# Patient Record
Sex: FEMALE | Race: WHITE | NOT HISPANIC OR LATINO | URBAN - METROPOLITAN AREA
[De-identification: names, ages, dates, MRNs, and addresses within clinical notes are randomized per-mention and may not be internally consistent; named-entity substitution may affect disease eponyms.]

---

## 2017-10-31 ENCOUNTER — OFFICE VISIT - HEALTHEAST (OUTPATIENT)
Dept: GERIATRICS | Facility: CLINIC | Age: 82
End: 2017-10-31

## 2017-10-31 ENCOUNTER — AMBULATORY - HEALTHEAST (OUTPATIENT)
Dept: ADMINISTRATIVE | Facility: CLINIC | Age: 82
End: 2017-10-31

## 2017-10-31 DIAGNOSIS — R53.81 PHYSICAL DECONDITIONING: ICD-10-CM

## 2017-10-31 DIAGNOSIS — R13.10 DYSPHAGIA: ICD-10-CM

## 2017-10-31 DIAGNOSIS — G62.9 NEUROPATHY: ICD-10-CM

## 2017-10-31 RX ORDER — LISINOPRIL 10 MG/1
10 TABLET ORAL DAILY
Status: SHIPPED | COMMUNITY
Start: 2017-10-31

## 2017-11-02 ENCOUNTER — OFFICE VISIT - HEALTHEAST (OUTPATIENT)
Dept: GERIATRICS | Facility: CLINIC | Age: 82
End: 2017-11-02

## 2017-11-02 DIAGNOSIS — I63.9 CVA (CEREBRAL VASCULAR ACCIDENT) (H): ICD-10-CM

## 2017-11-02 DIAGNOSIS — G62.9 NEUROPATHY: ICD-10-CM

## 2017-11-02 DIAGNOSIS — I10 HTN (HYPERTENSION): ICD-10-CM

## 2017-11-02 DIAGNOSIS — R13.10 DYSPHAGIA: ICD-10-CM

## 2017-11-09 ENCOUNTER — OFFICE VISIT - HEALTHEAST (OUTPATIENT)
Dept: GERIATRICS | Facility: CLINIC | Age: 82
End: 2017-11-09

## 2017-11-09 DIAGNOSIS — Z91.81 HISTORY OF FALLING: ICD-10-CM

## 2017-11-09 DIAGNOSIS — R53.81 PHYSICAL DECONDITIONING: ICD-10-CM

## 2017-11-09 DIAGNOSIS — Z87.898 HISTORY OF VERTIGO: ICD-10-CM

## 2017-11-14 ENCOUNTER — OFFICE VISIT - HEALTHEAST (OUTPATIENT)
Dept: GERIATRICS | Facility: CLINIC | Age: 82
End: 2017-11-14

## 2017-11-14 DIAGNOSIS — Z91.81 HISTORY OF FALLING: ICD-10-CM

## 2017-11-14 DIAGNOSIS — Z87.898 HISTORY OF VERTIGO: ICD-10-CM

## 2017-11-14 DIAGNOSIS — R53.81 PHYSICAL DECONDITIONING: ICD-10-CM

## 2017-11-14 DIAGNOSIS — I10 HTN (HYPERTENSION): ICD-10-CM

## 2017-11-16 ENCOUNTER — OFFICE VISIT - HEALTHEAST (OUTPATIENT)
Dept: GERIATRICS | Facility: CLINIC | Age: 82
End: 2017-11-16

## 2017-11-16 DIAGNOSIS — I10 HTN (HYPERTENSION): ICD-10-CM

## 2017-11-16 DIAGNOSIS — Z87.898 HISTORY OF VERTIGO: ICD-10-CM

## 2017-11-16 DIAGNOSIS — R29.6 RECURRENT FALLS: ICD-10-CM

## 2017-11-21 ENCOUNTER — OFFICE VISIT - HEALTHEAST (OUTPATIENT)
Dept: GERIATRICS | Facility: CLINIC | Age: 82
End: 2017-11-21

## 2017-11-21 DIAGNOSIS — R29.6 FALLS: ICD-10-CM

## 2017-11-21 DIAGNOSIS — I10 HTN (HYPERTENSION): ICD-10-CM

## 2017-11-21 DIAGNOSIS — G62.9 NEUROPATHY: ICD-10-CM

## 2017-11-22 ENCOUNTER — AMBULATORY - HEALTHEAST (OUTPATIENT)
Dept: GERIATRICS | Facility: CLINIC | Age: 82
End: 2017-11-22

## 2018-12-11 ENCOUNTER — AMBULATORY - HEALTHEAST (OUTPATIENT)
Dept: ADMINISTRATIVE | Facility: CLINIC | Age: 83
End: 2018-12-11

## 2018-12-11 ENCOUNTER — OFFICE VISIT - HEALTHEAST (OUTPATIENT)
Dept: GERIATRICS | Facility: CLINIC | Age: 83
End: 2018-12-11

## 2018-12-11 DIAGNOSIS — G62.9 NEUROPATHY: ICD-10-CM

## 2018-12-11 DIAGNOSIS — I10 ESSENTIAL HYPERTENSION: ICD-10-CM

## 2018-12-11 DIAGNOSIS — F01.50 VASCULAR DEMENTIA WITHOUT BEHAVIORAL DISTURBANCE (H): ICD-10-CM

## 2018-12-12 ENCOUNTER — OFFICE VISIT - HEALTHEAST (OUTPATIENT)
Dept: GERIATRICS | Facility: CLINIC | Age: 83
End: 2018-12-12

## 2018-12-12 DIAGNOSIS — E11.42 TYPE 2 DIABETES MELLITUS WITH DIABETIC POLYNEUROPATHY, WITHOUT LONG-TERM CURRENT USE OF INSULIN (H): ICD-10-CM

## 2018-12-12 DIAGNOSIS — G62.9 NEUROPATHY: ICD-10-CM

## 2018-12-12 DIAGNOSIS — I10 ESSENTIAL HYPERTENSION: ICD-10-CM
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
## 2018-12-13 ENCOUNTER — OFFICE VISIT - HEALTHEAST (OUTPATIENT)
Dept: GERIATRICS | Facility: CLINIC | Age: 83
End: 2018-12-13

## 2018-12-13 DIAGNOSIS — F01.50 VASCULAR DEMENTIA WITHOUT BEHAVIORAL DISTURBANCE (H): ICD-10-CM

## 2018-12-13 DIAGNOSIS — I10 ESSENTIAL HYPERTENSION: ICD-10-CM
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
## 2018-12-18 ENCOUNTER — OFFICE VISIT - HEALTHEAST (OUTPATIENT)
Dept: GERIATRICS | Facility: CLINIC | Age: 83
End: 2018-12-18
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
## 2018-12-18 DIAGNOSIS — I10 ESSENTIAL HYPERTENSION: ICD-10-CM

## 2018-12-18 DIAGNOSIS — F01.50 VASCULAR DEMENTIA WITHOUT BEHAVIORAL DISTURBANCE (H): ICD-10-CM

## 2018-12-19 ENCOUNTER — OFFICE VISIT - HEALTHEAST (OUTPATIENT)
Dept: GERIATRICS | Facility: CLINIC | Age: 83
End: 2018-12-19

## 2018-12-19 DIAGNOSIS — G62.9 NEUROPATHY: ICD-10-CM

## 2018-12-19 DIAGNOSIS — I63.9 CEREBROVASCULAR ACCIDENT (CVA), UNSPECIFIED MECHANISM (H): ICD-10-CM

## 2018-12-19 DIAGNOSIS — I10 ESSENTIAL HYPERTENSION: ICD-10-CM
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
## 2018-12-19 DIAGNOSIS — E11.42 TYPE 2 DIABETES MELLITUS WITH DIABETIC POLYNEUROPATHY, WITHOUT LONG-TERM CURRENT USE OF INSULIN (H): ICD-10-CM

## 2018-12-20 ENCOUNTER — OFFICE VISIT - HEALTHEAST (OUTPATIENT)
Dept: GERIATRICS | Facility: CLINIC | Age: 83
End: 2018-12-20

## 2018-12-20 DIAGNOSIS — I10 ESSENTIAL HYPERTENSION: ICD-10-CM

## 2018-12-20 DIAGNOSIS — F01.50 VASCULAR DEMENTIA WITHOUT BEHAVIORAL DISTURBANCE (H): ICD-10-CM

## 2018-12-20 DIAGNOSIS — R29.6 RECURRENT FALLS: ICD-10-CM

## 2018-12-27 ENCOUNTER — OFFICE VISIT - HEALTHEAST (OUTPATIENT)
Dept: GERIATRICS | Facility: CLINIC | Age: 83
End: 2018-12-27
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
## 2018-12-27 DIAGNOSIS — F01.50 VASCULAR DEMENTIA WITHOUT BEHAVIORAL DISTURBANCE (H): ICD-10-CM

## 2018-12-27 DIAGNOSIS — I10 ESSENTIAL HYPERTENSION: ICD-10-CM

## 2018-12-28 ENCOUNTER — OFFICE VISIT - HEALTHEAST (OUTPATIENT)
Dept: GERIATRICS | Facility: CLINIC | Age: 83
End: 2018-12-28

## 2018-12-28 DIAGNOSIS — I10 ESSENTIAL HYPERTENSION: ICD-10-CM

## 2018-12-28 DIAGNOSIS — E11.42 TYPE 2 DIABETES MELLITUS WITH DIABETIC POLYNEUROPATHY, WITHOUT LONG-TERM CURRENT USE OF INSULIN (H): ICD-10-CM
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
## 2018-12-28 DIAGNOSIS — G62.9 NEUROPATHY: ICD-10-CM

## 2019-01-03 ENCOUNTER — OFFICE VISIT - HEALTHEAST (OUTPATIENT)
Dept: GERIATRICS | Facility: CLINIC | Age: 84
End: 2019-01-03
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
## 2019-01-03 DIAGNOSIS — F01.50 VASCULAR DEMENTIA WITHOUT BEHAVIORAL DISTURBANCE (H): ICD-10-CM

## 2019-01-03 DIAGNOSIS — I10 ESSENTIAL HYPERTENSION: ICD-10-CM

## 2019-01-08 ENCOUNTER — OFFICE VISIT - HEALTHEAST (OUTPATIENT)
Dept: GERIATRICS | Facility: CLINIC | Age: 84
End: 2019-01-08

## 2019-01-08 DIAGNOSIS — F01.50 VASCULAR DEMENTIA WITHOUT BEHAVIORAL DISTURBANCE (H): ICD-10-CM

## 2019-01-08 DIAGNOSIS — R29.6 RECURRENT FALLS: ICD-10-CM

## 2019-01-08 DIAGNOSIS — I10 ESSENTIAL HYPERTENSION: ICD-10-CM

## 2019-01-16 ENCOUNTER — RECORDS - HEALTHEAST (OUTPATIENT)
Dept: LAB | Facility: CLINIC | Age: 84
End: 2019-01-16

## 2019-01-17 LAB — HBA1C MFR BLD: 7.2 % (ref 4.2–6.1)

## 2019-01-22 ENCOUNTER — OFFICE VISIT - HEALTHEAST (OUTPATIENT)
Dept: GERIATRICS | Facility: CLINIC | Age: 84
End: 2019-01-22

## 2019-01-22 DIAGNOSIS — I10 ESSENTIAL HYPERTENSION: ICD-10-CM

## 2019-01-22 DIAGNOSIS — R29.6 RECURRENT FALLS: ICD-10-CM

## 2019-01-22 DIAGNOSIS — F01.50 VASCULAR DEMENTIA WITHOUT BEHAVIORAL DISTURBANCE (H): ICD-10-CM

## 2019-01-22 DIAGNOSIS — G62.9 NEUROPATHY: ICD-10-CM

## 2019-01-23 ENCOUNTER — OFFICE VISIT - HEALTHEAST (OUTPATIENT)
Dept: GERIATRICS | Facility: CLINIC | Age: 84
End: 2019-01-23

## 2019-01-23 DIAGNOSIS — I10 ESSENTIAL HYPERTENSION: ICD-10-CM

## 2019-01-23 DIAGNOSIS — E11.42 TYPE 2 DIABETES MELLITUS WITH DIABETIC POLYNEUROPATHY, WITHOUT LONG-TERM CURRENT USE OF INSULIN (H): ICD-10-CM
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
## 2019-02-12 ENCOUNTER — OFFICE VISIT - HEALTHEAST (OUTPATIENT)
Dept: GERIATRICS | Facility: CLINIC | Age: 84
End: 2019-02-12
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
## 2019-02-12 DIAGNOSIS — I10 ESSENTIAL HYPERTENSION: ICD-10-CM

## 2019-02-12 DIAGNOSIS — I63.9 CEREBROVASCULAR ACCIDENT (CVA), UNSPECIFIED MECHANISM (H): ICD-10-CM

## 2019-02-13 ENCOUNTER — RECORDS - HEALTHEAST (OUTPATIENT)
Dept: LAB | Facility: CLINIC | Age: 84
End: 2019-02-13

## 2019-02-14 LAB
25(OH)D3 SERPL-MCNC: 36.8 NG/ML (ref 30–80)
ANION GAP SERPL CALCULATED.3IONS-SCNC: 9 MMOL/L (ref 5–18)
BUN SERPL-MCNC: 30 MG/DL (ref 8–28)
CALCIUM SERPL-MCNC: 10 MG/DL (ref 8.5–10.5)
CHLORIDE BLD-SCNC: 107 MMOL/L (ref 98–107)
CO2 SERPL-SCNC: 27 MMOL/L (ref 22–31)
CREAT SERPL-MCNC: 0.79 MG/DL (ref 0.6–1.1)
GFR SERPL CREATININE-BSD FRML MDRD: >60 ML/MIN/1.73M2
GLUCOSE BLD-MCNC: 107 MG/DL (ref 70–125)
POTASSIUM BLD-SCNC: 3.6 MMOL/L (ref 3.5–5)
SODIUM SERPL-SCNC: 143 MMOL/L (ref 136–145)

## 2019-02-21 ENCOUNTER — OFFICE VISIT - HEALTHEAST (OUTPATIENT)
Dept: GERIATRICS | Facility: CLINIC | Age: 84
End: 2019-02-21

## 2019-02-21 ENCOUNTER — RECORDS - HEALTHEAST (OUTPATIENT)
Dept: LAB | Facility: CLINIC | Age: 84
End: 2019-02-21

## 2019-02-21 DIAGNOSIS — E11.42 TYPE 2 DIABETES MELLITUS WITH DIABETIC POLYNEUROPATHY, WITHOUT LONG-TERM CURRENT USE OF INSULIN (H): ICD-10-CM
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
## 2019-02-21 DIAGNOSIS — I10 ESSENTIAL HYPERTENSION: ICD-10-CM

## 2019-02-21 DIAGNOSIS — I63.9 CEREBROVASCULAR ACCIDENT (CVA), UNSPECIFIED MECHANISM (H): ICD-10-CM

## 2019-02-22 LAB
ANION GAP SERPL CALCULATED.3IONS-SCNC: 8 MMOL/L (ref 5–18)
BUN SERPL-MCNC: 38 MG/DL (ref 8–28)
CALCIUM SERPL-MCNC: 9.2 MG/DL (ref 8.5–10.5)
CHLORIDE BLD-SCNC: 108 MMOL/L (ref 98–107)
CO2 SERPL-SCNC: 25 MMOL/L (ref 22–31)
CREAT SERPL-MCNC: 0.82 MG/DL (ref 0.6–1.1)
GFR SERPL CREATININE-BSD FRML MDRD: >60 ML/MIN/1.73M2
GLUCOSE BLD-MCNC: 117 MG/DL (ref 70–125)
POTASSIUM BLD-SCNC: 3.5 MMOL/L (ref 3.5–5)
SODIUM SERPL-SCNC: 141 MMOL/L (ref 136–145)

## 2019-03-28 ENCOUNTER — OFFICE VISIT - HEALTHEAST (OUTPATIENT)
Dept: GERIATRICS | Facility: CLINIC | Age: 84
End: 2019-03-28

## 2019-03-28 DIAGNOSIS — I10 ESSENTIAL HYPERTENSION: ICD-10-CM

## 2019-03-28 DIAGNOSIS — Z91.81 HISTORY OF FALLING: ICD-10-CM

## 2019-03-28 DIAGNOSIS — F01.50 VASCULAR DEMENTIA WITHOUT BEHAVIORAL DISTURBANCE (H): ICD-10-CM

## 2019-04-15 ENCOUNTER — OFFICE VISIT - HEALTHEAST (OUTPATIENT)
Dept: GERIATRICS | Facility: CLINIC | Age: 84
End: 2019-04-15

## 2019-04-15 DIAGNOSIS — L03.113 CELLULITIS OF RIGHT UPPER EXTREMITY: ICD-10-CM
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
## 2019-04-25 ENCOUNTER — OFFICE VISIT - HEALTHEAST (OUTPATIENT)
Dept: GERIATRICS | Facility: CLINIC | Age: 84
End: 2019-04-25

## 2019-04-25 DIAGNOSIS — L03.113 CELLULITIS OF RIGHT UPPER EXTREMITY: ICD-10-CM

## 2019-04-25 DIAGNOSIS — E11.42 TYPE 2 DIABETES MELLITUS WITH DIABETIC POLYNEUROPATHY, WITHOUT LONG-TERM CURRENT USE OF INSULIN (H): ICD-10-CM

## 2019-04-29 ENCOUNTER — RECORDS - HEALTHEAST (OUTPATIENT)
Dept: LAB | Facility: CLINIC | Age: 84
End: 2019-04-29

## 2019-04-30 LAB — HBA1C MFR BLD: 7.8 % (ref 4.2–6.1)

## 2019-06-06 ENCOUNTER — OFFICE VISIT - HEALTHEAST (OUTPATIENT)
Dept: GERIATRICS | Facility: CLINIC | Age: 84
End: 2019-06-06
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
## 2019-06-06 DIAGNOSIS — I10 ESSENTIAL HYPERTENSION: ICD-10-CM

## 2019-06-06 DIAGNOSIS — E11.42 TYPE 2 DIABETES MELLITUS WITH DIABETIC POLYNEUROPATHY, WITHOUT LONG-TERM CURRENT USE OF INSULIN (H): ICD-10-CM

## 2019-07-02 ENCOUNTER — OFFICE VISIT - HEALTHEAST (OUTPATIENT)
Dept: GERIATRICS | Facility: CLINIC | Age: 84
End: 2019-07-02

## 2019-07-02 DIAGNOSIS — I10 ESSENTIAL HYPERTENSION: ICD-10-CM

## 2019-07-02 DIAGNOSIS — R29.6 RECURRENT FALLS: ICD-10-CM

## 2019-07-02 DIAGNOSIS — F01.50 VASCULAR DEMENTIA WITHOUT BEHAVIORAL DISTURBANCE (H): ICD-10-CM

## 2019-07-18 ENCOUNTER — OFFICE VISIT - HEALTHEAST (OUTPATIENT)
Dept: GERIATRICS | Facility: CLINIC | Age: 84
End: 2019-07-18

## 2019-07-18 DIAGNOSIS — F01.50 VASCULAR DEMENTIA WITHOUT BEHAVIORAL DISTURBANCE (H): ICD-10-CM

## 2019-07-18 DIAGNOSIS — R29.6 RECURRENT FALLS: ICD-10-CM

## 2019-07-18 DIAGNOSIS — I10 ESSENTIAL HYPERTENSION: ICD-10-CM

## 2019-07-30 ENCOUNTER — OFFICE VISIT - HEALTHEAST (OUTPATIENT)
Dept: GERIATRICS | Facility: CLINIC | Age: 84
End: 2019-07-30

## 2019-07-30 DIAGNOSIS — I10 ESSENTIAL HYPERTENSION: ICD-10-CM

## 2019-07-30 DIAGNOSIS — R29.6 RECURRENT FALLS: ICD-10-CM

## 2019-07-30 DIAGNOSIS — F01.50 VASCULAR DEMENTIA WITHOUT BEHAVIORAL DISTURBANCE (H): ICD-10-CM

## 2019-08-06 ENCOUNTER — OFFICE VISIT - HEALTHEAST (OUTPATIENT)
Dept: GERIATRICS | Facility: CLINIC | Age: 84
End: 2019-08-06

## 2019-08-06 DIAGNOSIS — I10 ESSENTIAL HYPERTENSION: ICD-10-CM

## 2019-08-06 DIAGNOSIS — R29.6 RECURRENT FALLS: ICD-10-CM

## 2019-08-06 DIAGNOSIS — F01.50 VASCULAR DEMENTIA WITHOUT BEHAVIORAL DISTURBANCE (H): ICD-10-CM

## 2019-08-16 ENCOUNTER — RECORDS - HEALTHEAST (OUTPATIENT)
Dept: LAB | Facility: CLINIC | Age: 84
End: 2019-08-16

## 2019-08-19 LAB
ANION GAP SERPL CALCULATED.3IONS-SCNC: 3 MMOL/L (ref 5–18)
BUN SERPL-MCNC: 28 MG/DL (ref 8–28)
CALCIUM SERPL-MCNC: 9.8 MG/DL (ref 8.5–10.5)
CHLORIDE BLD-SCNC: 106 MMOL/L (ref 98–107)
CO2 SERPL-SCNC: 29 MMOL/L (ref 22–31)
CREAT SERPL-MCNC: 0.95 MG/DL (ref 0.6–1.1)
ERYTHROCYTE [DISTWIDTH] IN BLOOD BY AUTOMATED COUNT: 14.1 % (ref 11–14.5)
GFR SERPL CREATININE-BSD FRML MDRD: 55 ML/MIN/1.73M2
GLUCOSE BLD-MCNC: 132 MG/DL (ref 70–125)
HBA1C MFR BLD: 7.4 % (ref 4.2–6.1)
HCT VFR BLD AUTO: 38.7 % (ref 35–47)
HGB BLD-MCNC: 12.2 G/DL (ref 12–16)
MCH RBC QN AUTO: 29.2 PG (ref 27–34)
MCHC RBC AUTO-ENTMCNC: 31.5 G/DL (ref 32–36)
MCV RBC AUTO: 93 FL (ref 80–100)
PLATELET # BLD AUTO: 183 THOU/UL (ref 140–440)
PMV BLD AUTO: 10.2 FL (ref 8.5–12.5)
POTASSIUM BLD-SCNC: 4.1 MMOL/L (ref 3.5–5)
RBC # BLD AUTO: 4.18 MILL/UL (ref 3.8–5.4)
SODIUM SERPL-SCNC: 138 MMOL/L (ref 136–145)
TSH SERPL DL<=0.005 MIU/L-ACNC: 1.17 UIU/ML (ref 0.3–5)
VIT B12 SERPL-MCNC: 279 PG/ML (ref 213–816)
WBC: 8 THOU/UL (ref 4–11)

## 2020-01-01 ENCOUNTER — RECORDS - HEALTHEAST (OUTPATIENT)
Dept: LAB | Facility: CLINIC | Age: 85
End: 2020-01-01

## 2020-01-01 ENCOUNTER — AMBULATORY - HEALTHEAST (OUTPATIENT)
Dept: OTHER | Facility: CLINIC | Age: 85
End: 2020-01-01

## 2020-01-01 LAB
ALBUMIN SERPL-MCNC: 3.6 G/DL (ref 3.5–5)
ALP SERPL-CCNC: 95 U/L (ref 45–120)
ALT SERPL W P-5'-P-CCNC: 11 U/L (ref 0–45)
ANION GAP SERPL CALCULATED.3IONS-SCNC: 12 MMOL/L (ref 5–18)
ANION GAP SERPL CALCULATED.3IONS-SCNC: 6 MMOL/L (ref 5–18)
AST SERPL W P-5'-P-CCNC: 18 U/L (ref 0–40)
BILIRUB DIRECT SERPL-MCNC: 0.2 MG/DL
BILIRUB SERPL-MCNC: 0.5 MG/DL (ref 0–1)
BUN SERPL-MCNC: 29 MG/DL (ref 8–28)
BUN SERPL-MCNC: 36 MG/DL (ref 8–28)
CALCIUM SERPL-MCNC: 10.3 MG/DL (ref 8.5–10.5)
CALCIUM SERPL-MCNC: 9.1 MG/DL (ref 8.5–10.5)
CHLORIDE BLD-SCNC: 108 MMOL/L (ref 98–107)
CHLORIDE BLD-SCNC: 109 MMOL/L (ref 98–107)
CO2 SERPL-SCNC: 23 MMOL/L (ref 22–31)
CO2 SERPL-SCNC: 27 MMOL/L (ref 22–31)
CREAT SERPL-MCNC: 0.97 MG/DL (ref 0.6–1.1)
CREAT SERPL-MCNC: 1.19 MG/DL (ref 0.6–1.1)
ERYTHROCYTE [DISTWIDTH] IN BLOOD BY AUTOMATED COUNT: 13.5 % (ref 11–14.5)
ERYTHROCYTE [DISTWIDTH] IN BLOOD BY AUTOMATED COUNT: 14.9 % (ref 11–14.5)
GFR SERPL CREATININE-BSD FRML MDRD: 42 ML/MIN/1.73M2
GFR SERPL CREATININE-BSD FRML MDRD: 54 ML/MIN/1.73M2
GLUCOSE BLD-MCNC: 151 MG/DL (ref 70–125)
GLUCOSE BLD-MCNC: 176 MG/DL (ref 70–125)
HBA1C MFR BLD: 6.6 %
HBA1C MFR BLD: 6.9 % (ref 4.2–6.1)
HCT VFR BLD AUTO: 37.1 % (ref 35–47)
HCT VFR BLD AUTO: 38.5 % (ref 35–47)
HGB BLD-MCNC: 11.6 G/DL (ref 12–16)
HGB BLD-MCNC: 11.7 G/DL (ref 12–16)
MCH RBC QN AUTO: 28.6 PG (ref 27–34)
MCH RBC QN AUTO: 28.9 PG (ref 27–34)
MCHC RBC AUTO-ENTMCNC: 30.4 G/DL (ref 32–36)
MCHC RBC AUTO-ENTMCNC: 31.3 G/DL (ref 32–36)
MCV RBC AUTO: 93 FL (ref 80–100)
MCV RBC AUTO: 94 FL (ref 80–100)
PLATELET # BLD AUTO: 167 THOU/UL (ref 140–440)
PLATELET # BLD AUTO: 174 THOU/UL (ref 140–440)
PMV BLD AUTO: 10.1 FL (ref 8.5–12.5)
PMV BLD AUTO: 10.5 FL (ref 8.5–12.5)
POTASSIUM BLD-SCNC: 3.9 MMOL/L (ref 3.5–5)
POTASSIUM BLD-SCNC: 4 MMOL/L (ref 3.5–5)
PROT SERPL-MCNC: 6.6 G/DL (ref 6–8)
RBC # BLD AUTO: 4.01 MILL/UL (ref 3.8–5.4)
RBC # BLD AUTO: 4.09 MILL/UL (ref 3.8–5.4)
SODIUM SERPL-SCNC: 142 MMOL/L (ref 136–145)
SODIUM SERPL-SCNC: 143 MMOL/L (ref 136–145)
WBC: 6.3 THOU/UL (ref 4–11)
WBC: 7.5 THOU/UL (ref 4–11)

## 2021-05-25 ENCOUNTER — RECORDS - HEALTHEAST (OUTPATIENT)
Dept: ADMINISTRATIVE | Facility: CLINIC | Age: 86
End: 2021-05-25

## 2021-05-26 ENCOUNTER — RECORDS - HEALTHEAST (OUTPATIENT)
Dept: ADMINISTRATIVE | Facility: CLINIC | Age: 86
End: 2021-05-26

## 2021-05-27 ENCOUNTER — RECORDS - HEALTHEAST (OUTPATIENT)
Dept: ADMINISTRATIVE | Facility: CLINIC | Age: 86
End: 2021-05-27

## 2021-05-27 NOTE — PROGRESS NOTES
[facility]    Facility:   [facility] [[id number]]   Code Status: POLST AVAILABLE      CHIEF COMPLAINT/REASON FOR VISIT:  Chief Complaint   Patient presents with     Problem Visit     Right arm redness, drainage        HISTORY:      HPI: Devi is a 89 y.o. female with a history of hypertension, hyperlipidemia, diabetes on metformin, osteoarthritis,'s history of CVA and peripheral neuropathy/DJD.  She presented to the ED on 12/5 and discharged on Keflex and potassium for presumed UTI, was discharged and then returned on 12/7 for fall at home without major injury.  The patient herself is a good historian and denies hitting her head or injury.  She has no subsequent pain today.  She has had increasing falls over the last year and and was seen by PT/OT and told to start using a walker, per her family she has been noncompliant with a walker.  She does have some issues with incontinence and has reduced memory and cognition, she was using a PCA however PCA did not show up the week of her falls on admission and was subsequently fired.  Further workup revealed multifactorial causes for frequent falls including multiple weakness, inactivity, frailty, peripheral neuropathy and visual impairment.    LTC:     Today: Patient seen per nursing for new wound to the right elbow that began after a fall that occurred on Saturday evening.  The nursing staff has been cleansing the area with normal saline and monitoring and redness has gotten worse, she was up most of the night with pain to the right elbow and cannot place any pressure on it without discomfort.  I examined her skin, there is erythema surrounding the entire elbow, a small 1 cm cut and tenderness and warmth.    DM 2: On metformin 500 with most recent A1c of 7.2; fine for her age. Metformin d/c February.      Hypertension: on metoprolol and lisinopril with variable bps. Pulse stable. Blood pressures frequently up to 160/90s however most  often occurring on night shift or early morning prior to medications. Daytime bps have been 110//80.    Osteoarthritis/periphral nueropathy: on gabapentin at HS. Continue tylenol 1000mg three times a day scheduled.     Past Medical History:   Diagnosis Date     ARMD (age related macular degeneration)      Back pain      Cerebral infarct (H) 2015     Diabetes mellitus (H)      Diverticulitis      Dizziness 10/28/2017     DM (diabetes mellitus) (H)      Fall 10/28/2017     Falls frequently 2018     Hyperlipidemia      Hypertension      Kidney stones      Macular degeneration      Major depressive disorder, single episode, unspecified      Neuropathy (H)      OA (osteoarthritis)      PN (peripheral neuropathy)      Polyneuropathy, unspecified      Sciatica      Stroke (H)      Unspecified dementia without behavioral disturbance              Family History   Problem Relation Age of Onset     Lung cancer Mother      No Medical Problems Father      Leukemia Brother      Sudden death Daughter          age 50's spleen ruptured     Diabetes Other      Social History     Socioeconomic History     Marital status:      Spouse name: Not on file     Number of children: Not on file     Years of education: Not on file     Highest education level: Not on file   Occupational History     Not on file   Social Needs     Financial resource strain: Not on file     Food insecurity:     Worry: Not on file     Inability: Not on file     Transportation needs:     Medical: Not on file     Non-medical: Not on file   Tobacco Use     Smoking status: Never Smoker     Smokeless tobacco: Never Used   Substance and Sexual Activity     Alcohol use: No     Drug use: No     Sexual activity: Never   Lifestyle     Physical activity:     Days per week: Not on file     Minutes per session: Not on file     Stress: Not on file   Relationships     Social connections:     Talks on phone: Not on file     Gets together: Not on file      Attends Yarsani service: Not on file     Active member of club or organization: Not on file     Attends meetings of clubs or organizations: Not on file     Relationship status: Not on file     Intimate partner violence:     Fear of current or ex partner: Not on file     Emotionally abused: Not on file     Physically abused: Not on file     Forced sexual activity: Not on file   Other Topics Concern     Not on file   Social History Narrative     Not on file         Review of Systems   Constitutional: Negative for activity change, appetite change and fatigue.   HENT: Negative for congestion.    Respiratory: Negative for shortness of breath.    Cardiovascular: Negative for chest pain and leg swelling.   Gastrointestinal: Negative for abdominal pain, constipation and diarrhea.   Genitourinary: Negative for dysuria.   Musculoskeletal: Positive for arthralgias, back pain and gait problem.   Skin: Positive for wound.   Psychiatric/Behavioral: Positive for confusion.     .  Vitals:    04/18/19 0909   BP: 158/90   Pulse: 72   Temp: 98.2  F (36.8  C)   SpO2: 96%   Weight: 139 lb (63 kg)       Physical Exam   Constitutional: She appears well-developed and well-nourished.   HENT:   Head: Atraumatic.   Eyes: No scleral icterus.   Cardiovascular: Normal rate.   Murmur heard.  Pulmonary/Chest: No respiratory distress. She has no wheezes.   Abdominal: There is no tenderness.   Musculoskeletal: She exhibits no edema.   Lymphadenopathy:     She has no cervical adenopathy.   Neurological: She is alert.   Skin: Skin is warm and dry. There is erythema.   Right elbow with small, one cm cut with scant clear drainage, surrounding erythema 2 x 2 cm around elbow and wound, with warmth and tenderness.   Psychiatric:   Flat affect          LABS:   Reviewed.     ASSESSMENT:      ICD-10-CM    1. Fall, sequela W19.XXXS    2. Cellulitis of right upper extremity L03.113        PLAN:    Right arm injury at the elbow with small open area, redness,  warmth and tenderness after recent fall over the weekend.  Start doxycycline 100 mg p.o. twice daily times 7 days.  Continue wound care with cleanse with normal saline, apply bacitracin and apply 2 x 2 gauze to open area once daily.    Electronically signed by: Devin Liang CNP

## 2021-05-27 NOTE — PROGRESS NOTES
[facility]    Facility:   [facility] [[id number]]   Code Status: POLST AVAILABLE    89-year-old long-term care female is seen for review of multiple medical problems. History of progressive vascular dementia, hypertension, diabetes mellitus previously on oral agents, off diabetic agents for greater than two months, peripheral neuropathy, chronic major depression. Nursing staff report patient intermittently agitated, highly agitated on this date.    Review of systems: patient believes a lounge chair has been taken from her and replaced with an alternate chair. She has pushed the lounger into the hallway, highly upset. Denies recent falls. No headache. No focal neurologic deficits. No chest pain or palpitations. No active joint discomfort. Remainder of 12 point review of systems obtained negative. Roommate reports patient highly agitated, she has noted no falls, behavior typically agitated, at times calm. Nursing staff report patient with out recent falls, behavior periodically agitated.    Exam: vital signs reviewed over past four weeks. Alert, oriented to person only, not to time or place, highly agitated and angry, discussing her chair which she took care of and has now disappeared. Not easily distracted, not easy to calm, eventual calming takes place. No facial asymmetry. Extraocular movements intact. Mucous membranes moist. No pharyngeal erythema. No HJR. S1 and S2 regular with 1/6 systolic murmur. Pulmonary exam without rales rhonchi or wheezes. Abdomen without masses tenderness organomegaly. DJD changes bilateral knees without effusion or focal tenderness. No hand drift. Periphery with venous stasis changes, trace nonpitting edema. Pedal pulses -2 and symmetrical.    Impression and plan:   progressive vascular dementia, agitation intermittently present, no pharmacological intervention necessary.   Hypertension on lisinopril with adequate control of blood pressure.   Diabetes  mellitus on oral agents, recent cessation of Metformin, blood glucose control adequate.   Frequent falls, multifactorial, no injuries have occurred with falls.   Chronic major depression, should mood remain highly irritable consider adjustment of SSRI.   Multiple concerns are reviewed.      Electronically signed by: Mariangel Arrington MD

## 2021-05-28 ENCOUNTER — RECORDS - HEALTHEAST (OUTPATIENT)
Dept: ADMINISTRATIVE | Facility: CLINIC | Age: 86
End: 2021-05-28

## 2021-05-28 NOTE — PROGRESS NOTES
[facility]    Facility:   [facility] [[id number]]   Code Status: POLST AVAILABLE      CHIEF COMPLAINT/REASON FOR VISIT:  Chief Complaint   Patient presents with     Problem Visit       HISTORY:      HPI: Devi is a 89 y.o. female with a history of hypertension, hyperlipidemia, diabetes on metformin, osteoarthritis,'s history of CVA and peripheral neuropathy/DJD.  She presented to the ED on 12/5 and discharged on Keflex and potassium for presumed UTI, was discharged and then returned on 12/7 for fall at home without major injury.  The patient herself is a good historian and denies hitting her head or injury.  She has no subsequent pain today.  She has had increasing falls over the last year and and was seen by PT/OT and told to start using a walker, per her family she has been noncompliant with a walker.  She does have some issues with incontinence and has reduced memory and cognition, she was using a PCA however PCA did not show up the week of her falls on admission and was subsequently fired.  Further workup revealed multifactorial causes for frequent falls including multiple weakness, inactivity, frailty, peripheral neuropathy and visual impairment.    LTC:     Today: Patient seen for follow-up of right elbow cellulitis that was started on doxycycline 9 days ago.  Area remains red, scant purulent drainage, some mild tenderness however this is improved from her previous visit.  Given her diabetes and continued tenderness extend cycling course.  Patient also complaining that pain is worse at nighttime if she does not have a bandage in place.    DM 2: On metformin 500 with most recent A1c of 7.2; fine for her age. Metformin d/c February.      Hypertension: on metoprolol and lisinopril with variable bps. Pulse stable. Blood pressures frequently up to 160/90s however most often occurring on night shift or early morning prior to medications. Daytime bps have been  110//80.    Osteoarthritis/periphral nueropathy: on gabapentin at HS. Continue tylenol 1000mg three times a day scheduled.     Past Medical History:   Diagnosis Date     ARMD (age related macular degeneration)      Back pain      Cerebral infarct (H) 2015     Diabetes mellitus (H)      Diverticulitis      Dizziness 10/28/2017     DM (diabetes mellitus) (H)      Fall 10/28/2017     Falls frequently 2018     Hyperlipidemia      Hypertension      Kidney stones      Macular degeneration      Major depressive disorder, single episode, unspecified      Neuropathy (H)      OA (osteoarthritis)      PN (peripheral neuropathy)      Polyneuropathy, unspecified      Sciatica      Stroke (H)      Unspecified dementia without behavioral disturbance              Family History   Problem Relation Age of Onset     Lung cancer Mother      No Medical Problems Father      Leukemia Brother      Sudden death Daughter          age 50's spleen ruptured     Diabetes Other      Social History     Socioeconomic History     Marital status:      Spouse name: Not on file     Number of children: Not on file     Years of education: Not on file     Highest education level: Not on file   Occupational History     Not on file   Social Needs     Financial resource strain: Not on file     Food insecurity:     Worry: Not on file     Inability: Not on file     Transportation needs:     Medical: Not on file     Non-medical: Not on file   Tobacco Use     Smoking status: Never Smoker     Smokeless tobacco: Never Used   Substance and Sexual Activity     Alcohol use: No     Drug use: No     Sexual activity: Never   Lifestyle     Physical activity:     Days per week: Not on file     Minutes per session: Not on file     Stress: Not on file   Relationships     Social connections:     Talks on phone: Not on file     Gets together: Not on file     Attends Oriental orthodox service: Not on file     Active member of club or organization: Not on  file     Attends meetings of clubs or organizations: Not on file     Relationship status: Not on file     Intimate partner violence:     Fear of current or ex partner: Not on file     Emotionally abused: Not on file     Physically abused: Not on file     Forced sexual activity: Not on file   Other Topics Concern     Not on file   Social History Narrative     Not on file         Review of Systems   Constitutional: Negative for activity change, appetite change and fatigue.   HENT: Negative for congestion.    Respiratory: Negative for shortness of breath.    Cardiovascular: Negative for chest pain and leg swelling.   Gastrointestinal: Negative for abdominal pain, constipation and diarrhea.   Genitourinary: Negative for dysuria.   Musculoskeletal: Positive for arthralgias, back pain and gait problem.   Skin: Positive for wound.   Psychiatric/Behavioral: Positive for confusion.     .  Vitals:    04/26/19 1344   BP: 150/84   Pulse: 65   Temp: 97  F (36.1  C)   SpO2: 95%   Weight: 139 lb (63 kg)       Physical Exam   Constitutional: She appears well-developed and well-nourished.   HENT:   Head: Atraumatic.   Eyes: No scleral icterus.   Cardiovascular: Normal rate.   Murmur heard.  Pulmonary/Chest: No respiratory distress. She has no wheezes.   Abdominal: There is no tenderness.   Musculoskeletal: She exhibits no edema.   Lymphadenopathy:     She has no cervical adenopathy.   Neurological: She is alert.   Skin: Skin is warm and dry. There is erythema.   Right elbow with small, one cm cut with scant clear drainage, surrounding erythema 2 x 2 cm around elbow and wound.    Psychiatric:   Flat affect          LABS:   Reviewed.     ASSESSMENT:      ICD-10-CM    1. Cellulitis of right upper extremity L03.113    2. Type 2 diabetes mellitus with diabetic polyneuropathy, without long-term current use of insulin (H) E11.42        PLAN:    Extend doxycycline 100 mg p.o. twice daily times 7 more days for total of 14-day  course.  Continue wound care with cleanse with normal saline, apply bacitracin and apply 2 x 2 gauze to open area once daily.  Please ensure patient is going to bed with a gauze pad on right elbow for wound protection and comfort.    Electronically signed by: Devin Liang CNP

## 2021-05-29 NOTE — PROGRESS NOTES
[facility]    Facility:   [facility] [[id number]]   Code Status: POLST AVAILABLE      CHIEF COMPLAINT/REASON FOR VISIT:  Chief Complaint   Patient presents with     Review Of Multiple Medical Conditions       HISTORY:      HPI: Devi is a 89 y.o. female with a history of hypertension, hyperlipidemia, diabetes on metformin, osteoarthritis,'s history of CVA and peripheral neuropathy/DJD.  She presented to the ED on 12/5 and discharged on Keflex and potassium for presumed UTI, was discharged and then returned on 12/7 for fall at home without major injury.  The patient herself is a good historian and denies hitting her head or injury.  She has no subsequent pain today.  She has had increasing falls over the last year and and was seen by PT/OT and told to start using a walker, per her family she has been noncompliant with a walker.  She does have some issues with incontinence and has reduced memory and cognition, she was using a PCA however PCA did not show up the week of her falls on admission and was subsequently fired.  Further workup revealed multifactorial causes for frequent falls including multiple weakness, inactivity, frailty, peripheral neuropathy and visual impairment.    Review: Patient with frequent falls per facility guideline, however she is typically slipping to floor from her chair or kneeling to the floor. No injury with any recent falls. Has not had any head injury. I did review medications, orthostatics, blood sugars extensively when she transferred to LTC with unclear etiology for frequent falling. Does occasionally trip on items in the room or forget to use her walker and PT/OT has been trialed with limited improvement.   Additionally, her bps are variable however have been lower avg /60. On lisinopril 20 and metoprolol 37.5. q am and 50mg q pm.     DM 2: A1c in January 7.2, metformin d/c due to frequent falls as r/o possible hypoglycemia with recheck  a1c in late April of 7.8.This is appropriate for age.     Hypertension: on metoprolol and lisinopril with variable bps.     Osteoarthritis/periphral nueropathy: on gabapentin at HS. Consider scheduled apap. Currently denies pain at todays visit.     Past Medical History:   Diagnosis Date     ARMD (age related macular degeneration)      Back pain      Cerebral infarct (H) 2015     Diabetes mellitus (H)      Diverticulitis      Dizziness 10/28/2017     DM (diabetes mellitus) (H)      Fall 10/28/2017     Falls frequently 2018     Hyperlipidemia      Hypertension      Kidney stones      Macular degeneration      Major depressive disorder, single episode, unspecified      Neuropathy (H)      OA (osteoarthritis)      PN (peripheral neuropathy)      Polyneuropathy, unspecified      Sciatica      Stroke (H)      Unspecified dementia without behavioral disturbance              Family History   Problem Relation Age of Onset     Lung cancer Mother      No Medical Problems Father      Leukemia Brother      Sudden death Daughter          age 50's spleen ruptured     Diabetes Other      Social History     Socioeconomic History     Marital status:      Spouse name: Not on file     Number of children: Not on file     Years of education: Not on file     Highest education level: Not on file   Occupational History     Not on file   Social Needs     Financial resource strain: Not on file     Food insecurity:     Worry: Not on file     Inability: Not on file     Transportation needs:     Medical: Not on file     Non-medical: Not on file   Tobacco Use     Smoking status: Never Smoker     Smokeless tobacco: Never Used   Substance and Sexual Activity     Alcohol use: No     Drug use: No     Sexual activity: Never   Lifestyle     Physical activity:     Days per week: Not on file     Minutes per session: Not on file     Stress: Not on file   Relationships     Social connections:     Talks on phone: Not on file     Gets  together: Not on file     Attends Sabianism service: Not on file     Active member of club or organization: Not on file     Attends meetings of clubs or organizations: Not on file     Relationship status: Not on file     Intimate partner violence:     Fear of current or ex partner: Not on file     Emotionally abused: Not on file     Physically abused: Not on file     Forced sexual activity: Not on file   Other Topics Concern     Not on file   Social History Narrative     Not on file         Review of Systems   Constitutional: Negative for activity change, appetite change and fatigue.   HENT: Negative for congestion.    Eyes: Positive for visual disturbance.   Respiratory: Negative for shortness of breath.    Cardiovascular: Negative for chest pain and leg swelling.   Gastrointestinal: Negative for abdominal pain, constipation and diarrhea.   Genitourinary: Negative for dysuria.   Musculoskeletal: Positive for gait problem.   Neurological: Positive for dizziness.   Psychiatric/Behavioral: Positive for confusion.       .  Vitals:    06/10/19 1141   BP: 102/69   Pulse: 62   Temp: 97.4  F (36.3  C)   SpO2: 95%   Weight: 137 lb (62.1 kg)       Physical Exam   Constitutional: She appears well-developed and well-nourished.   HENT:   Head: Atraumatic.   Eyes: No scleral icterus.   Cardiovascular: Normal rate.   Murmur heard.  Pulmonary/Chest: No respiratory distress. She has no wheezes.   Abdominal: There is no tenderness.   Musculoskeletal: She exhibits no edema.   Lymphadenopathy:     She has no cervical adenopathy.   Neurological: She is alert.   Skin: Skin is warm and dry.   Psychiatric:   Flat affect          LABS:   Reviewed.     ASSESSMENT:      ICD-10-CM    1. Fall, sequela W19.XXXS    2. Type 2 diabetes mellitus with diabetic polyneuropathy, without long-term current use of insulin (H) E11.42    3. Essential hypertension I10        PLAN:    Decrease lisinopril to 10mg po daily.   Continue bp checks M,W,F.   I  reviewed recent labs and VS x 2 months. Weights are stable.     I have reviewed the facility/SNF care plan/MDS which was done June 2019, including the falls risk, nutrition and pain screening. I also reviewed the current immunizations, and preventive care.. Future cancer screening is not clinically indicated secondary to age/goals of care. Patient's desire to return to the community is not active at this time.       Electronically signed by: Devin Liang CNP

## 2021-05-30 ENCOUNTER — RECORDS - HEALTHEAST (OUTPATIENT)
Dept: ADMINISTRATIVE | Facility: CLINIC | Age: 86
End: 2021-05-30
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
## 2021-05-30 NOTE — PROGRESS NOTES
[facility]    Facility:   [facility] [[id number]]   Code Status: POLST AVAILABLE  89-year-old female long-term care resident with advanced dementia, hypertension, history of multiple falls, chronic depression, recently moved from long-term care area to [facility] area due to altercations with roommates, is seen for evaluation regarding need for diabetic shoes. Recent request for diabetic shoes her from podiatrist denied. Patient has bunion's, history of diabetes not requiring treatment, mild peripheral neuropathy, typically wears open toed shoes, has had no previous complaints of foot pain, was seen by podiatrist, order for diabetic shoes received, request denied by this MD. Per  telephone call received personally from podiatrist to  stating she was to tell patient that  had denied patient having diabetic shoes.    Review of systems: patient states she has received new shoes from the podiatrist. She is wearing the shoes and they are very uncomfortable. She has a blister superior aspect of right great toe from the shoes which are too large. She does not like the new shoes, per patient her daughter states shoes are too big, patient wishes to resume use of her regular shoes. No previous blistering. Has bunions which are not painful. Typically wears personal shoes which she does not find to be uncomfortable. Admits to mild numbness of feet. No open areas toes previously present.No claudication. Denies cardiac or pulmonary symptoms. No recent falls. Remainder of 12 point review of systems obtained negative.    Exam: patient is observed ambulating in [place], shuffling in new shoes, has not previously been seen shuffling. Bilateral feet are examined, pedal pulses mildly diminished and symmetrical, open blister superior aspect  right great toe, no surrounding erythema. Bunion deformity present. Modest DJD changes of digital joints, no acute inflammatory change. No  plantar surface tenderness. Decreased sensation. S1 and S2 regular. Pulmonary exam without rales rhonchi or wheezes. Abdomen without masses or tenderness. Degenerative changes of digital joints and knees without acute inflammatory change.    Impression and plan:   recent request for diabetic shoes denied, patient has history of diabetes mellitus, not currently receiving treatment, chronic bunion deformities nonpainful, DJD changes of toes, patient has received new shoes despite MD denial for the shoes, new blister superior aspect of right great toe. Patient states her daughter is contacting podiatrist regarding ill fitting shoes which patient does not wish to wear.   Advanced dementia, recent altercations with roommates, behavior satisfactory in independent room on TCU floor.   Hypertension with adequate control of blood pressure.   Advanced dementia with intermittent behavioral abnormalities.   Chronic depression, mood stable at present.        Electronically signed by: Mariangel Arrington MD

## 2021-05-31 NOTE — PROGRESS NOTES
[facility]    Facility:   [facility] [[id number]]   Code Status: POLST AVAILABLE    89-year-old female is seen for follow up evaluation and discharge planning from [facility]. History of progressive dementia, behavioral irregularities with recent combative behavior with roommates, has been residing in a separate room without behavioral abnormalities noted. History significant for diabetes mellitus previously on Metformin, off Metformin at this time, hypertension on lisinopril 20 mg Q day, metoprolol 37.5 a.m. 50 p.m., recurrent falls, numerous falls have occurred during long-term care stay, no injuries noted. History of hypokalemia on replacement. Bunions of feet, recently acquired diabetic shoes which fit poorly and created blister of  right great superior toe, shoes to be returned, wearing open toed shoes at present. No cardiac or pulmonary symptomatology. Will be transferring to memory care unit.    Review of systems: denies current headache. Mildly anxious regarding transfer to new facility, excited however regarding a private room. Denies cardiac or pulmonary symptoms. No fever sweats or chills. No current foot pain. Previous superficial blister right great toe has resolved. No active joint discomfort at present, frequently has knee pain. Remainder of 12 point review of systems obtained negative.    Exam: vital signs reviewed over past one month, infrequent elevation of systolic blood pressure to 160, overall satisfactory readings. No facial asymmetry. Affect flat, conversant. Mucous membranes moist. No HJR or carotid bruits. S1 and S2 regular with 2/6 systolic murmur. Pulmonary exam without rales rhonchi or wheezes. Abdomen without masses tenderness organomegaly. DJD changes bilateral knees without acute inflammatory change or focal tenderness. Venous stasis changes bilateral lower extremities, no pitting edema. Pedal pulses diminished and symmetrical. No hand  drift.    Impression and plan:   hypertension with satisfactory control of blood pressure.   Frequent falls, no acute injuries over past one year.   Advanced dementia, intermittent behavioral irregularities, no behavioral issues over past two weeks.   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   Diabetes mellitus previously on Metformin, off Metformin with satisfactory control of blood glucose.   Chronic major depression, continues SSRI, mood currently stable.   Discharge medications are as follows:  Current Outpatient Medications:      aspirin 81 MG EC tablet, Take 81 mg by mouth daily., Disp: , Rfl:      cholecalciferol, vitamin D3, 1,000 unit tablet, Take 1,000 Units by mouth every morning., Disp: , Rfl:      FLUoxetine (PROZAC) 20 MG tablet, Take 20 mg by mouth every morning., Disp: , Rfl:      gabapentin (NEURONTIN) 100 MG capsule, Take 100 mg by mouth at bedtime .   , Disp: , Rfl:      lisinopril (PRINIVIL,ZESTRIL) 20 MG tablet, Take 20 mg by mouth daily .   , Disp: , Rfl:      metoprolol succinate (TOPROL-XL) 25 MG, Take 37.5 mg by mouth 2 (two) times a day .   , Disp: , Rfl:      potassium chloride (KLOR-CON) 10 MEQ CR tablet, Take 10 mEq by mouth 2 (two) times a day., Disp: , Rfl:      simvastatin (ZOCOR) 20 MG tablet, Take 20 mg by mouth every evening., Disp: , Rfl:  lisinopril at 20 mg Q day, atenolol 37.5/50. On transfer to memory care patient will require supervised care, registered nurse, physical therapy and occupational therapy to maintain strength. Discharge orders are signed. Total time of discharge evaluation greater than 30 minutes, greater than 50% of time spent in coordination of care and counseling.      Electronically signed by: Mariangel Arrington MD

## 2021-05-31 NOTE — PROGRESS NOTES
[facility]    Facility:   [facility] [[id number]]   Code Status: POLST AVAILABLE    89-year-old female is seen at request of nursing staff for fall that occurred this morning. Residing in U area, recent disturbances with roommates necessitating transfer to private room. History of vascular dementia, chronic depression, intermittent agitation, hyper tension and frequent falls.    Review of systems: patient reports falling this morning. States she simply lost her balance after getting out of bed, denies head trauma. Unaware of injury to limbs. No focal neurologic symptoms. Unaware of new onset dizziness. Denies chest pain or palpitations. No residual pain. Continues to not wear recently obtained orthopedic shoes which caused an abrasion superior aspect right great toe. Wearing open toed footwear which she has used for some time. Remainder of 12 point review of systems obtained negative.    Nursing staff report patient found on floor this morning, small abrasion lower back, superficial, no other associated findings.    Exam: temperature 97.8, pulse 67, respiratory 18, blood pressure 145/90, 02 97% on room air. Oriented to person and place, not to time, arranging items in her closet, standing up right without evidence of imbalance. No facial asymmetry. Mucous membranes moist. No HJR or carotid bruits. S1 and S2 regular. Pulmonary exam without rales or wheezes. Abdomen without masses tenderness. Periphery with venous stasis changes, no pitting edema. Pedal pulses diminished and symmetrical. Small scabbed superior aspect right great toe, no surrounding erythema.    Impression and plan:   vascular dementia, no recent behavioral abnormalities.   Chronic hypertension with adequate control of blood pressure, hypotensive episodes not appreciated.   History of recurrent falls, fall this morning being found on floor, no evidence of trauma, small abrasion back covered with dressing, reported  by nursing to be superficial.   Chronic depression continuing Prozac.   Recent behavioral abnormalities resolved.   Continue observation of neurologic and behavioral status.      Electronically signed by: Mariangel Arrington MD

## 2021-06-01 ENCOUNTER — RECORDS - HEALTHEAST (OUTPATIENT)
Dept: ADMINISTRATIVE | Facility: CLINIC | Age: 86
End: 2021-06-01

## 2021-06-02 ENCOUNTER — RECORDS - HEALTHEAST (OUTPATIENT)
Dept: ADMINISTRATIVE | Facility: CLINIC | Age: 86
End: 2021-06-02

## 2021-06-02 VITALS — WEIGHT: 135 LBS | BODY MASS INDEX: 29.21 KG/M2

## 2021-06-02 VITALS — WEIGHT: 139 LBS | BODY MASS INDEX: 30.08 KG/M2

## 2021-06-02 VITALS — WEIGHT: 137 LBS | BODY MASS INDEX: 29.65 KG/M2

## 2021-06-02 VITALS — BODY MASS INDEX: 29.65 KG/M2 | WEIGHT: 137 LBS

## 2021-06-02 VITALS — WEIGHT: 136 LBS | BODY MASS INDEX: 29.43 KG/M2

## 2021-06-13 NOTE — PROGRESS NOTES
[facility]    Facility:   [facility]   Code Status: POLST AVAILABLE    Reevaluation of 89-year-old female presented to hospital following a fall. No fractures or head injury. Generalized weakness present transferred to TCU for rehabilitation, management of medical problems which include hypertension, peripheral neuropathy, dementia, insomnia, history of CV A.    Review of systems: patient reports her strength is gradually improving. She ambulates with a walker, denies orthostatic changes. No focal neurologic deficits. Denies symptoms of dizziness or syncope, no cardiac symptomatology. Fatigue is present at all times. Remainder of 12 point review of systems obtained negative.    Exam: vital signs reviewed over past 48 hours. Patient vague in presentation, decreased hearing, pleasant, cooperative, observed ambulating with steady gait. Mucous membranes moist. No credit bruits. S1 and S2 regular. Pulmonary exam with shallow inspiratory effort, no rales or wheezes. Abdomen without masses tenderness organomegaly. Periphery with nonpitting edema. Strength and muscle tone -2 and symmetrical. No hand drift or tremor.    Impression and plan: generalized weakness, history of recent mechanical fall, no complaints of joint pain. Chronic hypertension on lisinopril and metoprolol, no orthostatic changes, continue to monitor blood pressure. Peripheral neuropathy interfering with gait stability. Cognitive deficit, cognition appears stable. History of CVA, no focal neurologic deficits. Care plan and medications are reviewed.      Electronically signed by: Mariangel Arrington MD

## 2021-06-13 NOTE — PROGRESS NOTES
[facility]    Facility:   [facility] [[id number]]   Code Status: POLST AVAILABLE    Admission evaluation to TCU you of 89-year-old female. History is taken from accompanying hospital notes, patient with dementia is unable to provide any significant history. Patient was brought to emergency room following a mechanical fall. Patient denied tripping, denied preceding cardiac or pulmonary symptoms, no acute bony injury noted, no acute metabolic abnormalities, patient with history of chronic dizziness, stabilized and  transferred to TCU for rehabilitation, management of medical problems which include hypertension, diabetes mellitus diet controlled, past history of cerebral infarction, peripheral neuropathy.    Past medical history, social history, family history, drug allergies, medication list, code status reviewed in epic.    Review of systems: patient reports she had times feels lightheaded, cannot define true vertiginous symptoms. Denies fever sweats or chills. Unaware of anterior chest discomfort. Does not note that change of position causes dizziness, unaware of any particular time of day when dizziness might occur. Denies injury to any bony areas with fall. Unaware of memory deficit. Remainder of 12 point review of systems obtained negative.    Exam: patient cooperative, pleasant, oriented times one, in bed, in no apparent distress. Temperature 97.1, pulse 60, respiratory 18, blood pressure 158/92, weight 137.1, height 4 ft 9 inch. In no apparent distress, affect flat, no pharyngeal erythema. S1 and S2 regular. Pulmonary exam with shallow inspiratory effort, no rales rhonchi or wheezes. No carotid bruits or HJR.No nystagmus, PERRLA, EOMI. Abdomen without masses tenderness organomegaly. Strength and muscle tone -2 and symmetrical upper and lower extremities. Pedal pulses minus one. No hand drift or fasciculations. Joints without acute inflammatory change.    Impression and plan:  dementia, baseline cognition unknown. History of recurrent falls, cause multifactorial including peripheral neuropathy and generalized weakness/deconditioning, patient additionally on multiple antihypertensive medications, no  indication of hypotensive symptoms, given diagnosis of BPV in hospital, meclizine 12.5 mg TID for one week ordered, observe while on this medication. Peripheral neuropathy,contributing to fall risk, continue gabapentin 200 mg QHS. Hypertension on lisinopril 40, metoprolol 25 b.i.d., monitor for orthostasis, follow blood pressure recordings. Chronic insomnia on melatonin. DJD  of multiple joints, no current complaints of pain. Hyperlipidemia chronic. Previous history of CVA, no focal neurologic deficits. Total time of admission evaluation greater than 45 minutes, greater than 50% of time spent in coordination of care and counseling.      Electronically signed by: Mariangel Arrington MD

## 2021-06-14 NOTE — PROGRESS NOTES
[facility]    Facility:   [facility] [[id number]]   Code Status: POLST AVAILABLE     Amy Skelton Tuesday [id number]    Reevaluation of patient admitted to hospital following a fall, history of recurrent falls, deconditioning, hypertension, vertigo, stabilized in hospital, no acute CNS event found, transferred to TCU for observation of clinical status and management of medical problems.    Review of systems: patient reports she feels well but is tired. Denies any falls. Denies lightheadedness with standing. No fever sweats or chills. No cardiac or pulmonary symptoms. Nursing staff report patient was found on floor for third time over the past one week, they report this was due to poor decision making on patient's part. Remainder of 12 point review of systems obtained negative.    Exam: patient animated, alert, has difficulty recalling specifics of past events, pleasant. Temperature 97.8, pulse 82, 02 97%, respiratory 16. No use of accessory muscles at rest. No carotid bruits. S1 and S2 regular. Pulmonary exam without rales or wheezes. Abdomen without masses tenderness organomegaly. Periphery with nonpitting edema. Joints without acute inflammatory change. Strength and muscle tone -1 upper and lower extremities.    Impression and plan: hypertension, variable blood pressure recordings, interval increase in systolic blood pressure to 170, continue current antihypertensives and regular blood pressure checks. Deconditioning, continue rehabilitation. Poor judgment per nursing staff, found on floor on three occasions, no injury noted, patient on all occasions was apparently attempting to do activities at the floor level, no definitive falls. History of vertigo, no current symptoms. Care plan and medications and multiple concerns of patient are reviewed. Continue rehabilitation measures.        Electronically signed by: Mariangel Arrington MD

## 2021-06-14 NOTE — PROGRESS NOTES
[facility]    Facility:   [facility] [[id number]]   Code Status: POLST AVAILABLE       Reevaluation of patient admitted to hospital following a fall, history of recurrent falls, no acute bony injury, no acute CNS process, transferred to TCU for rehabilitation, management of medical problems hypertension and deconditioning.    Review of systems: patient states she was on the floor yesterday, she was attempting to clean out a cupboard, states she cannot she could not get off the floor, no injury reported by nursing staff. Denies confusion, no episodes of dizziness. Denies cardiac or pulmonary symptoms. No fever sweats or chills. Believes her strength is mildly improved, continues with physical therapy, ambulates with a cane. No active pain symptomatology. Remainder of 12 point review of systems obtained negative.    Exam: patient alert and oriented times three, in no apparent distress, vital signs reviewed over past 48 hours. Affect flat, able to give excellent history. No pharyngeal erythema. No carotid bruits or HJR. S1 and S2 regular with 1/6 systolic murmur. Pulmonary exam with shallow inspiratory effort, no rales or wheezes. Abdomen without masses or tenderness. DJD changes of knees, no focal tenderness. Periphery with venous stasis changes, no pitting edema. Strength and muscle tone -1.    Impression and plan: chronic deconditioning, history of recurrent falls, hypertension on beta blocker and lisinopril, no evidence of orthostatic changes, recently found on floor, no bony injuries. Deconditioning appears to be a major factor contributing to patient's falls, continue rehabilitation. It was initially thought patient had memory deficit, her cognition is clear at this time. History of vertigo without recurrent symptoms. Meclazine is available on a PRN basis. Care plan and medications are reviewed and without change.         Electronically signed by: Mariangel Arrington MD

## 2021-06-14 NOTE — PROGRESS NOTES
[facility]    Facility:   [facility] [[id number]]   Code Status: POLST AVAILABLE      Reevaluation of patient admitted to hospital with recurrent falls. History of hypertension, vertigo, mild memory deficit.    Review of systems: patient reports that she will be unable to return home. She has been found on the floor on three occasions, states her family cannot care for her at home. She denies any mechanical falls, on all occasions states she was attempting to do an activity and simply found herself on the floor without sustaining injuries. Denies anxiety, no hypotensive episodes. Denies cardiac or pulmonary symptomatology. Remainder of 12 point review of systems obtained negative.    Exam: patient pleasant, oriented times three, difficulty recalling recent events to some extent. Vital signs reviewed over past 48 hours. No facial asymmetry. No carotid bruits or HJR. S1 and S2 regular with systolic murmur. Pulmonary exam without rales rhonchi or wheezes. Abdomen without masses tenderness organomegaly. Periphery with nonpitting edema trace, venous stasis changes present. No calf tenderness or swelling. Pedal pulses symmetrical. Degenerative changes of knees and digital joints without acute inflammatory change.    Impression and plan: recurrent falls, judgment impaired per nursing staff, this contributing to falls, continue rehabilitation measures. Hypertension, intermittent elevation of blood pressure, continue current antihypertensives. History of vertigo not currently symptomatic. Care plan medications and multiple concerns are reviewed, issues of concern reviewed with nursing staff.    Electronically signed by: Mariangel Arrington MD

## 2021-06-14 NOTE — PROGRESS NOTES
[facility]    Facility:   [facility] [[id number]]   Code Status: POLST AVAILABLE    Patient is seen for follow up evaluation and discharge planning. 89-year-old female admitted to hospital post mechanical fall hitting head. Preceding history of CV A, mild memory deficit, diabetes mellitus with peripheral neuropathy, hypertension, vertigo, no acute neurologic deficit identified, stabilized and transferred to TCU for rehabilitation, management of medical problems. Patient is completing course in TCU, three falls have occurred while in TCU, no injuries noted, on these three occasions found by nurses sitting on the floor, she reported a variety of issues which  brought her to the floor, on all occasions denied actual falling. Blood pressure intermittently elevated while in TCU. No symptoms of vertigo. Elevated blood pressure recordings required increase of metoprolol  To 37.5 b.i.d. from 25 mg BID. Oxybutynin, melatonin and gabapentin discontinued on admission to TCU, started on trazodone 25 mg QHS on a PRN basis.    Review of systems: patient denies joint pain, palpitations, fever sweats or chills. Has no recall of recent falls. Denies joint symptomatology. No focal neurologic deficits. Aware of intermittent confusion versus memory deficit. Remainder of 12 point review of systems obtained negative.    Exam: patient lying in bed, irritable, believed she was going home today, the actual time being tomorrow. Blood pressure 144/91, temperature 97.1, pulse 77, respiratory 16, 02 94% on room air. No facial asymmetry. Decreased hearing. Extraocular movements intact. S1 and S2 regular with systolic flow murmur. Pulmonary exam shallow inspiratory effort, no rales rhonchi or wheezes. Abdomen without hepatosplenomegaly or masses. Periphery with trace venous stasis changes, no pedal edema. DJD changes of digital joints and knees without acute inflammatory change. Pedal pulses diminished and  symmetrical.    Impression and plan: chronic hypertension, adequate control of blood pressure. Chronic deconditioning, strength improved in TCU. Peripheral neuropathy which contributes to fall potential. Hyperlipidemia on statin. DJD of multiple joints. Mild memory deficit. Change in medications from those at admission, increase in metoprolol to 37.5 mg BID, discontinuation of oxybutynin melatonin and gabapentin, initiation of trazodone 25 mg Q day. Total time of discharge evaluation greater than 35 minutes, greater than 50% of time spent in coordination of care and counseling. Follow up evaluation will be with regular physician.Patient will be homebound, in view of recurrent falls and memory deficit will require home PT, OT, HHA, RN.      Electronically signed by: Mariangel Arrington MD

## 2021-06-16 PROBLEM — I61.0 BASAL GANGLIA HEMORRHAGE (H): Status: ACTIVE | Noted: 2020-01-01

## 2021-06-16 PROBLEM — I10 HTN (HYPERTENSION): Status: ACTIVE | Noted: 2017-11-05

## 2021-06-16 PROBLEM — I16.1 HYPERTENSIVE EMERGENCY: Status: ACTIVE | Noted: 2020-01-01

## 2021-06-16 PROBLEM — G93.6 VASOGENIC EDEMA (H): Status: ACTIVE | Noted: 2020-01-01

## 2021-06-16 PROBLEM — R29.6 FALLS: Status: ACTIVE | Noted: 2017-11-23

## 2021-06-16 PROBLEM — L03.113 CELLULITIS OF RIGHT UPPER EXTREMITY: Status: ACTIVE | Noted: 2019-04-18

## 2021-06-16 PROBLEM — E11.9 TYPE 2 DIABETES MELLITUS, WITHOUT LONG-TERM CURRENT USE OF INSULIN (H): Status: ACTIVE | Noted: 2018-12-14

## 2021-06-16 PROBLEM — I61.9 HEMORRHAGIC STROKE (H): Status: ACTIVE | Noted: 2020-01-01

## 2021-06-18 NOTE — LETTER
Letter by Devin Liang CNP at      Author: Devin Liang CNP Service: -- Author Type: --    Filed:  Encounter Date: 2/21/2019 Status: (Other)         Patient: Devi Field   MR Number: [id number]   YOB: 1927   Date of Visit: 2/21/2019     [facility]    Facility:   [facility] [[id number]]   Code Status: POLST AVAILABLE      CHIEF COMPLAINT/REASON FOR VISIT:  Chief Complaint   Patient presents with   ? Problem Visit     falls/ED visit       HISTORY:      HPI: Devi is a 89 y.o. female with a history of hypertension, hyperlipidemia, diabetes on metformin, osteoarthritis,'s history of CVA and peripheral neuropathy/DJD.  She presented to the ED on 12/5 and discharged on Keflex and potassium for presumed UTI, was discharged and then returned on 12/7 for fall at home without major injury.  The patient herself is a good historian and denies hitting her head or injury.  She has no subsequent pain today.  She has had increasing falls over the last year and and was seen by PT/OT and told to start using a walker, per her family she has been noncompliant with a walker.  She does have some issues with incontinence and has reduced memory and cognition, she was using a PCA however PCA did not show up the week of her falls on admission and was subsequently fired.  Further workup revealed multifactorial causes for frequent falls including multiple weakness, inactivity, frailty, peripheral neuropathy and visual impairment.    LTC: Patient has had increased number of falls since arriving to nursing home.  She has had 12 total falls however none with injury.  No hitting of her head.  We have adjusted some of her medications including her beta blocker and   Discontinue her Metformin.  Blood sugars not seem to be playing a role in this as well as blood pressures as her blood pressures are typically elevated and generally greater than 120/75.  She did have a fall  "just before my visit so I did examine her just post fall and she was sitting on the floor with one shoe half off, and she reported that her \"walker can keeps getting in the way \"she does not seem to understand that she should be using her walker to help ambulate up the room.  Dementia seems to be a big factor in her falls as well as mechanical with tripping.  PT and OT have both examined her room.    Today: Patient was seen in the ED last evening for frequent falls and one fall with hitting her head, she apparently had some increased aggressiveness and agitation since that fall so nursing was concerned for acute head injury.  CT of the head was normal for acute process, she was evaluated and found to be stable, she was acting per baseline in the ED and her daughter was with her.  They did find mild AK I with a UTI so I will repeat a BMP on Friday and will continue the Keflex that was prescribed.    DM 2: On metformin 500 with most recent A1c of 7.2; fine for her age. Metformin d/c two weeks ago.     Hypertension: on metoprolol and lisinopril with variable bps. Pulse stable. Blood pressures frequently up to 160/90s however most often occurring on night shift or early morning prior to medications. Daytime bps have been 110//80.    Osteoarthritis/periphral nueropathy: on gabapentin at HS. Continue tylenol 1000mg three times a day scheduled.     Past Medical History:   Diagnosis Date   ? ARMD (age related macular degeneration)    ? Back pain    ? Cerebral infarct (H) 04/14/2015   ? Diabetes mellitus (H)    ? Diverticulitis    ? Dizziness 10/28/2017   ? DM (diabetes mellitus) (H)    ? Fall 10/28/2017   ? Falls frequently 12/07/2018   ? Hyperlipidemia    ? Hypertension    ? Kidney stones    ? Macular degeneration    ? Major depressive disorder, single episode, unspecified    ? Neuropathy (H)    ? OA (osteoarthritis)    ? PN (peripheral neuropathy)    ? Polyneuropathy, unspecified    ? Sciatica    ? Stroke (H)    ? " Unspecified dementia without behavioral disturbance              Family History   Problem Relation Age of Onset   ? Lung cancer Mother    ? No Medical Problems Father    ? Leukemia Brother    ? Sudden death Daughter          age 50's spleen ruptured   ? Diabetes Other      Social History     Socioeconomic History   ? Marital status:      Spouse name: Not on file   ? Number of children: Not on file   ? Years of education: Not on file   ? Highest education level: Not on file   Occupational History   ? Not on file   Social Needs   ? Financial resource strain: Not on file   ? Food insecurity:     Worry: Not on file     Inability: Not on file   ? Transportation needs:     Medical: Not on file     Non-medical: Not on file   Tobacco Use   ? Smoking status: Never Smoker   ? Smokeless tobacco: Never Used   Substance and Sexual Activity   ? Alcohol use: No   ? Drug use: No   ? Sexual activity: No   Lifestyle   ? Physical activity:     Days per week: Not on file     Minutes per session: Not on file   ? Stress: Not on file   Relationships   ? Social connections:     Talks on phone: Not on file     Gets together: Not on file     Attends L[employer] service: Not on file     Active member of club or organization: Not on file     Attends meetings of clubs or organizations: Not on file     Relationship status: Not on file   ? Intimate partner violence:     Fear of current or ex partner: Not on file     Emotionally abused: Not on file     Physically abused: Not on file     Forced sexual activity: Not on file   Other Topics Concern   ? Not on file   Social History Narrative   ? Not on file         Review of Systems   Constitutional: Negative for activity change, appetite change and fatigue.   HENT: Negative for congestion.    Respiratory: Negative for shortness of breath.    Cardiovascular: Negative for chest pain and leg swelling.   Gastrointestinal: Negative for abdominal pain, constipation and diarrhea.   Genitourinary:  Negative for dysuria.   Musculoskeletal: Positive for arthralgias, back pain and gait problem.   Psychiatric/Behavioral: Positive for confusion.     .  Vitals:    02/22/19 1258   BP: 110/60   Pulse: 75   Resp: (!) 93   Temp: 98  F (36.7  C)       Physical Exam   Constitutional: She appears well-developed and well-nourished.   HENT:   Head: Atraumatic.   Eyes: No scleral icterus.   Cardiovascular: Normal rate.   Murmur heard.  Pulmonary/Chest: No respiratory distress. She has no wheezes.   Abdominal: There is no tenderness.   Musculoskeletal: She exhibits no edema.   Lymphadenopathy:     She has no cervical adenopathy.   Neurological: She is alert.   Skin: Skin is warm and dry.   Psychiatric:   Flat affect          LABS:   Reviewed.     ASSESSMENT:      ICD-10-CM    1. Cerebrovascular accident (CVA), unspecified mechanism (H) I63.9    2. Fall, sequela W19.XXXS    3. Type 2 diabetes mellitus with diabetic polyneuropathy, without long-term current use of insulin (H) E11.42    4. Essential hypertension I10        PLAN:    Obtain f/u BMP Friday.   Continue keflex for possible UTI.   Is participating in PT/OT so we will continue this per their recommendations.   Continue off metformin and decreased metoprolol.     Electronically signed by: Devin Liang CNP

## 2021-06-18 NOTE — LETTER
Letter by Devin Liang CNP at      Author: Devin Liang CNP Service: -- Author Type: --    Filed:  Encounter Date: 2/12/2019 Status: (Other)         Patient: Devi Field   MR Number: [id number]   YOB: 1927   Date of Visit: 2/12/2019     [facility]    Facility:   [facility] [[id number]]   Code Status: POLST AVAILABLE      CHIEF COMPLAINT/REASON FOR VISIT:  Chief Complaint   Patient presents with   ? Problem Visit       HISTORY:      HPI: Devi is a 89 y.o. female with a history of hypertension, hyperlipidemia, diabetes on metformin, osteoarthritis,'s history of CVA and peripheral neuropathy/DJD.  She presented to the ED on 12/5 and discharged on Keflex and potassium for presumed UTI, was discharged and then returned on 12/7 for fall at home without major injury.  The patient herself is a good historian and denies hitting her head or injury.  She has no subsequent pain today.  She has had increasing falls over the last year and and was seen by PT/OT and told to start using a walker, per her family she has been noncompliant with a walker.  She does have some issues with incontinence and has reduced memory and cognition, she was using a PCA however PCA did not show up the week of her falls on admission and was subsequently fired.  Further workup revealed multifactorial causes for frequent falls including multiple weakness, inactivity, frailty, peripheral neuropathy and visual impairment.    LTC: Patient has had increased number of falls since arriving to nursing home.  She has had 12 total falls however none with injury.  No hitting of her head.  We have adjusted some of her medications including her beta blocker and   Discontinue her Metformin.  Blood sugars not seem to be playing a role in this as well as blood pressures as her blood pressures are typically elevated and generally greater than 120/75.  She did have a fall just before my visit  "so I did examine her just post fall and she was sitting on the floor with one shoe half off, and she reported that her \"walker can keeps getting in the way \"she does not seem to understand that she should be using her walker to help ambulate up the room.  Dementia seems to be a big factor in her falls as well as mechanical with tripping.  PT and OT have both examined her room.    DM 2: On metformin 500 with most recent A1c of 7.2; fine for her age. Metformin d/c two weeks ago.     Hypertension: on metoprolol and lisinopril with variable bps. Pulse stable. Blood pressures frequently up to 160/90s however most often occurring on night shift or early morning prior to medications. Daytime bps have been 110//80.    Osteoarthritis/periphral nueropathy: on gabapentin at HS. Continue tylenol 1000mg three times a day scheduled.     Past Medical History:   Diagnosis Date   ? ARMD (age related macular degeneration)    ? Back pain    ? Cerebral infarct (H) 2015   ? Diabetes mellitus (H)    ? Diverticulitis    ? Dizziness 10/28/2017   ? DM (diabetes mellitus) (H)    ? Fall 10/28/2017   ? Falls frequently 2018   ? Hyperlipidemia    ? Hypertension    ? Kidney stones    ? Macular degeneration    ? Major depressive disorder, single episode, unspecified    ? Neuropathy (H)    ? OA (osteoarthritis)    ? PN (peripheral neuropathy)    ? Polyneuropathy, unspecified    ? Sciatica    ? Stroke (H)    ? Unspecified dementia without behavioral disturbance              Family History   Problem Relation Age of Onset   ? Lung cancer Mother    ? No Medical Problems Father    ? Leukemia Brother    ? Sudden death Daughter          age 50's spleen ruptured   ? Diabetes Other      Social History     Socioeconomic History   ? Marital status:      Spouse name: Not on file   ? Number of children: Not on file   ? Years of education: Not on file   ? Highest education level: Not on file   Social Needs   ? Financial resource " strain: Not on file   ? Food insecurity - worry: Not on file   ? Food insecurity - inability: Not on file   ? Transportation needs - medical: Not on file   ? Transportation needs - non-medical: Not on file   Occupational History   ? Not on file   Tobacco Use   ? Smoking status: Never Smoker   ? Smokeless tobacco: Never Used   Substance and Sexual Activity   ? Alcohol use: No   ? Drug use: No   ? Sexual activity: No   Other Topics Concern   ? Not on file   Social History Narrative   ? Not on file         Review of Systems   Constitutional: Negative for activity change, appetite change and fatigue.   HENT: Negative for congestion.    Respiratory: Negative for shortness of breath.    Cardiovascular: Negative for chest pain and leg swelling.   Gastrointestinal: Negative for abdominal pain, constipation and diarrhea.   Genitourinary: Negative for dysuria.   Musculoskeletal: Positive for arthralgias, back pain and gait problem.   Psychiatric/Behavioral: Positive for confusion.     .  Vitals:    02/15/19 1456   BP: 115/63   Pulse: 85   SpO2: 95%       Physical Exam   Constitutional: She appears well-developed and well-nourished.   HENT:   Head: Atraumatic.   Eyes: No scleral icterus.   Cardiovascular: Normal rate.   Murmur heard.  Pulmonary/Chest: No respiratory distress. She has no wheezes.   Abdominal: There is no tenderness.   Musculoskeletal: She exhibits no edema.   Lymphadenopathy:     She has no cervical adenopathy.   Neurological: She is alert.   Skin: Skin is warm and dry.   Psychiatric:   Flat affect          LABS:   Reviewed.     ASSESSMENT:      ICD-10-CM    1. Fall, sequela W19.XXXS    2. Essential hypertension I10    3. Cerebrovascular accident (CVA), unspecified mechanism (H) I63.9        PLAN:    Obtain BMP to look for electrolyte abnormality, and also vitamin D.  Decrease fluoxetine to 10 mg p.o. daily however not convinced this is contributing to falls.  PT and OT to reexamine for mechanical issues  related to fall.  Please assist patient with obtaining new shoes that are easier to get on and off and will not slip off her feet during ambulation.  OT to reexamine walker and appropriateness.    Electronically signed by: Devin Liang CNP

## 2021-06-18 NOTE — LETTER
Letter by Mariangel Arrignton MD at      Author: Mariangel Arrington MD Service: -- Author Type: --    Filed:  Encounter Date: 1/3/2019 Status: (Other)         Patient: Devi Field   MR Number: [id number]   YOB: 1927   Date of Visit: 1/3/2019     [facility]    Facility:   [facility] [[id number]]   Code Status: POLST AVAILABLE    Reassessment of 89-year-old female admitted to hospital with recurrent falls, no fractures, stabilized and transferred to TCU for rehabilitation, management of medical problems which include hypertension and major depression.    Review of systems: fatigue present at all times. Denies new falls. No active pain symptoms. No exertional chest discomfort. Somewhat aware of memory loss. Denies acute confusion. No focal neurologic deficits. Remainder of 12 point review of systems obtained negative.    Exam: alert, oriented times one, lying in bed, drowsy, cooperative and pleasant. Redundant in conversation. In no apparent distress. No pharyngeal erythema. No facial asymmetry. No carotid bruits or HJR. Thyroid midline and regular. Pulmonary exam without rales rhonchi or wheezes. S1 and S2 regular with 2/6 systolic murmur. Abdomen without masses tenderness organomegaly. Modest DJD changes of digital joints and knees, no acute inflammatory change. Venous stasis changes lower extremities, no pedal edema. Pedal pulses symmetrical.    Impression and plan:   advanced dementia, no behavioral abnormalities.   History of major depression, mood remains stable.   Hypertension with adequate control of blood pressure.   History of multiple falls, no bony injury, no falls over past five days.   Disposition, patient will require long-term care placement in view of advanced memory deficit and generalized decline in status over recent months.   Medications reviewed.      Electronically signed by: Mariangel Arrington MD

## 2021-06-18 NOTE — LETTER
Letter by Devin Liang CNP at      Author: Devin Liang CNP Service: -- Author Type: --    Filed:  Encounter Date: 12/28/2018 Status: (Other)         Patient: Devi Field   MR Number: [id number]   YOB: 1927   Date of Visit: 12/28/2018     [facility]    Facility:   [facility] [[id number]]   Code Status: POLST AVAILABLE      CHIEF COMPLAINT/REASON FOR VISIT:  Chief Complaint   Patient presents with   ? Review Of Multiple Medical Conditions       HISTORY:      HPI: Devi is a 89 y.o. female with a history of hypertension, hyperlipidemia, diabetes on metformin, osteoarthritis,'s history of CVA and peripheral neuropathy/DJD.  She presented to the ED on 12/5 and discharged on Keflex and potassium for presumed UTI, was discharged and then returned on 12/7 for fall at home without major injury.  The patient herself is a good historian and denies hitting her head or injury.  She has no subsequent pain today.  She has had increasing falls over the last year and and was seen by PT/OT and told to start using a walker, per her family she has been noncompliant with a walker.  She does have some issues with incontinence and has reduced memory and cognition, she was using a PCA however PCA did not show up the week of her falls on admission and was subsequently fired.  Further workup revealed multifactorial causes for frequent falls including multiple weakness, inactivity, frailty, peripheral neuropathy and visual impairment.    TCU:   Continues to have variable bps between 118//90. Attending therapies, using a walker to ambulate throughout the facility, has had multiple falls without injury in TCU; orthos recorded and have been variable: few with drops >20mmhg, some were stable; no changes to meds as of yet. On lisinopril and metoprolol.     DM 2: On metformin with most recent A1c of 7.7.    Hypertension: on metoprolol and lisinopril with variable  bps.     Osteoarthritis/periphral nueropathy: on gabapentin at HS. Consider scheduled apap. Currently denies pain at todays visit.     Past Medical History:   Diagnosis Date   ? ARMD (age related macular degeneration)    ? Back pain    ? Cerebral infarct (H) 2015   ? Diabetes mellitus (H)    ? Diverticulitis    ? Dizziness 10/28/2017   ? DM (diabetes mellitus) (H)    ? Fall 10/28/2017   ? Falls frequently 2018   ? Hyperlipidemia    ? Hypertension    ? Kidney stones    ? Macular degeneration    ? Major depressive disorder, single episode, unspecified    ? Neuropathy (H)    ? OA (osteoarthritis)    ? PN (peripheral neuropathy)    ? Polyneuropathy, unspecified    ? Sciatica    ? Stroke (H)    ? Unspecified dementia without behavioral disturbance              Family History   Problem Relation Age of Onset   ? Lung cancer Mother    ? No Medical Problems Father    ? Leukemia Brother    ? Sudden death Daughter          age 50's spleen ruptured   ? Diabetes Other      Social History     Socioeconomic History   ? Marital status:      Spouse name: Not on file   ? Number of children: Not on file   ? Years of education: Not on file   ? Highest education level: Not on file   Social Needs   ? Financial resource strain: Not on file   ? Food insecurity - worry: Not on file   ? Food insecurity - inability: Not on file   ? Transportation needs - medical: Not on file   ? Transportation needs - non-medical: Not on file   Occupational History   ? Not on file   Tobacco Use   ? Smoking status: Never Smoker   ? Smokeless tobacco: Never Used   Substance and Sexual Activity   ? Alcohol use: No   ? Drug use: No   ? Sexual activity: No   Other Topics Concern   ? Not on file   Social History Narrative   ? Not on file         Review of Systems   Constitutional: Negative for activity change, appetite change and fatigue.   HENT: Negative for congestion.    Eyes: Positive for visual disturbance.   Respiratory: Negative for  shortness of breath.    Cardiovascular: Negative for chest pain and leg swelling.   Gastrointestinal: Negative for abdominal pain, constipation and diarrhea.   Genitourinary: Negative for dysuria.   Musculoskeletal: Positive for gait problem.   Neurological: Positive for dizziness.   Psychiatric/Behavioral: Positive for confusion.       .  Vitals:    12/30/18 1748   BP: 147/81   Pulse: 81   Temp: 97  F (36.1  C)       Physical Exam   Constitutional: She appears well-developed and well-nourished.   HENT:   Head: Atraumatic.   Eyes: No scleral icterus.   Cardiovascular: Normal rate.   Murmur heard.  Pulmonary/Chest: No respiratory distress. She has no wheezes.   Abdominal: There is no tenderness.   Musculoskeletal: She exhibits no edema.   Lymphadenopathy:     She has no cervical adenopathy.   Neurological: She is alert.   Skin: Skin is warm and dry.   Psychiatric:   Flat affect          LABS:   Reviewed.     ASSESSMENT:      ICD-10-CM    1. Essential hypertension I10    2. Type 2 diabetes mellitus with diabetic polyneuropathy, without long-term current use of insulin (H) E11.42    3. Fall, sequela W19.XXXS    4. Neuropathy (H) G62.9        PLAN:    Continue to monitor bp and dizziness. Reminders for ambulation to use call light. Roommate helpful for reminder.         Electronically signed by: Devin Liang CNP

## 2021-06-18 NOTE — LETTER
Letter by Devin Liang CNP at      Author: Devin Liang CNP Service: -- Author Type: --    Filed:  Encounter Date: 1/23/2019 Status: (Other)         Patient: Devi Field   MR Number: [id number]   YOB: 1927   Date of Visit: 1/23/2019     [facility]    Facility:   [facility] [[id number]]   Code Status: POLST AVAILABLE      CHIEF COMPLAINT/REASON FOR VISIT:  Chief Complaint   Patient presents with   ? Problem Visit     pain, blood pressures       HISTORY:      HPI: Devi is a 89 y.o. female with a history of hypertension, hyperlipidemia, diabetes on metformin, osteoarthritis,'s history of CVA and peripheral neuropathy/DJD.  She presented to the ED on 12/5 and discharged on Keflex and potassium for presumed UTI, was discharged and then returned on 12/7 for fall at home without major injury.  The patient herself is a good historian and denies hitting her head or injury.  She has no subsequent pain today.  She has had increasing falls over the last year and and was seen by PT/OT and told to start using a walker, per her family she has been noncompliant with a walker.  She does have some issues with incontinence and has reduced memory and cognition, she was using a PCA however PCA did not show up the week of her falls on admission and was subsequently fired.  Further workup revealed multifactorial causes for frequent falls including multiple weakness, inactivity, frailty, peripheral neuropathy and visual impairment.    LTC:   Continues to have variable bps between 118/76- 190/100. Attending therapies, using a walker to ambulate throughout the facility, has had multiple falls without injury in TCU and now on LTC; frequent falls continue to be an issue. She has been moved to a room near nursing station. On lisinopril and metoprolol.     DM 2: On metformin 500 with most recent A1c of 7.2; fine for her age. Consider d/c metformin and track BG daily  as we are already doing.     Hypertension: on metoprolol and lisinopril with variable bps. Pulse stable. Blood pressures frequently up to 190/90s however most often occurring on night shift or early morning prior to medications. Daytime bps have been 150s/70s.     Osteoarthritis/periphral nueropathy: on gabapentin at HS. Will add tylenol 1000mg three times a day scheduled.     Past Medical History:   Diagnosis Date   ? ARMD (age related macular degeneration)    ? Back pain    ? Cerebral infarct (H) 2015   ? Diabetes mellitus (H)    ? Diverticulitis    ? Dizziness 10/28/2017   ? DM (diabetes mellitus) (H)    ? Fall 10/28/2017   ? Falls frequently 2018   ? Hyperlipidemia    ? Hypertension    ? Kidney stones    ? Macular degeneration    ? Major depressive disorder, single episode, unspecified    ? Neuropathy (H)    ? OA (osteoarthritis)    ? PN (peripheral neuropathy)    ? Polyneuropathy, unspecified    ? Sciatica    ? Stroke (H)    ? Unspecified dementia without behavioral disturbance              Family History   Problem Relation Age of Onset   ? Lung cancer Mother    ? No Medical Problems Father    ? Leukemia Brother    ? Sudden death Daughter          age 50's spleen ruptured   ? Diabetes Other      Social History     Socioeconomic History   ? Marital status:      Spouse name: Not on file   ? Number of children: Not on file   ? Years of education: Not on file   ? Highest education level: Not on file   Social Needs   ? Financial resource strain: Not on file   ? Food insecurity - worry: Not on file   ? Food insecurity - inability: Not on file   ? Transportation needs - medical: Not on file   ? Transportation needs - non-medical: Not on file   Occupational History   ? Not on file   Tobacco Use   ? Smoking status: Never Smoker   ? Smokeless tobacco: Never Used   Substance and Sexual Activity   ? Alcohol use: No   ? Drug use: No   ? Sexual activity: No   Other Topics Concern   ? Not on file    Social History Narrative   ? Not on file         Review of Systems   Constitutional: Negative for activity change, appetite change and fatigue.   HENT: Negative for congestion.    Eyes: Positive for visual disturbance.   Respiratory: Negative for shortness of breath.    Cardiovascular: Negative for chest pain and leg swelling.   Gastrointestinal: Negative for abdominal pain, constipation and diarrhea.   Genitourinary: Negative for dysuria.   Musculoskeletal: Positive for arthralgias, back pain and gait problem.   Neurological: Positive for dizziness.   Psychiatric/Behavioral: Positive for confusion.       .  Vitals:    01/27/19 1243   BP: 176/90   Pulse: 70   Temp: 98  F (36.7  C)   Weight: 137 lb (62.1 kg)       Physical Exam   Constitutional: She appears well-developed and well-nourished.   HENT:   Head: Atraumatic.   Eyes: No scleral icterus.   Cardiovascular: Normal rate.   Murmur heard.  Pulmonary/Chest: No respiratory distress. She has no wheezes.   Abdominal: There is no tenderness.   Musculoskeletal: She exhibits no edema.   Lymphadenopathy:     She has no cervical adenopathy.   Neurological: She is alert.   Skin: Skin is warm and dry.   Psychiatric:   Flat affect          LABS:   Reviewed.     ASSESSMENT:      ICD-10-CM    1. Essential hypertension I10    2. Type 2 diabetes mellitus with diabetic polyneuropathy, without long-term current use of insulin (H) E11.42    3. Fall, sequela W19.XXXS        PLAN:    Start apap 1000mg po three times a day for arthralgias and joint/back pain.   Increase HS metoprolol from 37.5 to 50mg q hs in hopes to cover night time elevations. Monitor bp's M,W,F and pulse.   Will review blood sugars and consider d/c metformin with close follow up. She is 89 and a1c of 7.2 is well below recommended limit for age. No renal dx.     Electronically signed by: Devin Liang, NESTOR

## 2021-06-18 NOTE — LETTER
Letter by Mariangel Arrington MD at      Author: Mariangel Arrington MD Service: -- Author Type: --    Filed:  Encounter Date: 1/22/2019 Status: (Other)         Patient: Devi Field   MR Number: [id number]   YOB: 1927   Date of Visit: 1/22/2019     [facility]    Facility:   [facility] [[id number]]   Code Status: POLST AVAILABLE    Admission evaluation to long-term [facility] on transfer from TCU. History is taken from previous medical notes, patient is able to give a limited history due to advanced dementia. Chronic hypertension, DJD bilateral knees, chronic major depression, progressive memory deficit on the basis of vascular dementia, history of multiple falls without fracture, etiology of falls multifactorial, fell at [facility], admitted to hospital, no acute  medical issues identified, transferred to TCU  for rehabilitation, several falls from edge of bed while in TCU without injury, due to advanced dementia transferred to long-term [facility].    Past medical history, current medical problem list, drug allergies, current medication list, social history, family history, code status reviewed in epic.    Review of systems: fatigue present at all times. Sleeps frequently through the day. Denies any acute or chronic pain. Limited mobility, denies focal neurologic deficits. Not aware of memory deficit. Denies hallucinations or delusions. No chest pain or palpitations. No current G.I. or  symptoms. Adapting to [facility], enjoys her room and roommate. Remainder of 12 point review of systems obtained negative.    Exam: vital signs reviewed since admission to [facility]. Oriented to person, somewhat to place, not to time, conversant and in no apparent distress sitting on edge of bed. Follows all conversation. Mood content. No facial asymmetry. Extraocular movements intact. Pharynx without erythema. Mild decreased vision. Thyroid without  nodularity. No carotid bruits or HJR. S1 and S2 regular. Pulmonary exam without rales rhonchi or wheezes. Abdomen without masses tenderness organomegaly. DJD changes bilateral knees without acute inflammatory change. Pedal pulses symmetrical. No hand drift. Hand grasp symmetrical. Strength and muscle tone diminished and symmetrical upper and lower extremities.    Impression and plan:   progressive vascular dementia without behavioral abnormalities.   Hypertension on lisinopril with satisfactory control of blood pressure, no orthostatic changes.   Peripheral neuropathy on gabapentin, nonpainful, decreased sensation contributing to falls.   Macular degeneration with vision deficit.   History of CVA without focal neurologic deficits.   Diabetes mellitus on oral agents, blood glucose control satisfactory.   History of multiple falls dating back years, no neurologic orthostatic or cardiac etiology identified, falls typically are from edge of bed.   Medical records are reviewed, patient concerns reviewed, medications reviewed.      Electronically signed by: Mariangel Arrington MD

## 2021-06-18 NOTE — LETTER
Letter by Mariangel Arrington MD at      Author: Mariangel Arrington MD Service: -- Author Type: --    Filed:  Encounter Date: 1/8/2019 Status: (Other)         Patient: Devi Field   MR Number: [id number]   YOB: 1927   Date of Visit: 1/8/2019     [facility]    Facility:   [facility] [[id number]]   Code Status: POLST AVAILABLE    89-year-old female is seen for reevaluation. Admitted to hospital with falls and generalized weakness, no acute medical issues identified, transferred to TCU for rehabilitation and management of medical problems which include dementia, hypertension, recurrent falls, chronic major depression.    Review of systems: continues with generalized fatigue. Denies chest pain or palpitations. No recent falls have occurred. Prefers to be in bed during the day, walks with assistance, at times independently. No cardiac or pulmonary symptoms. No focal neurologic deficits. Remainder of 12 point review of systems obtained negative.    Exam: vital signs reviewed over past five days. Oriented to person, somewhat to place and not to time, attentive. Answers all questions appropriately. No facial asymmetry. No pharyngeal erythema. No credit bruits or HJR. S1 and S2 regular with systolic murmur. Pulmonary exam without rales rhonchi or wheezes. Abdomen without masses tenderness organomegaly. Modest DJD changes of joints without acute inflammatory change. Periphery with venous stasis changes, no pitting edema. Pedal pulses symmetrical.    Impression and plan:   dementia, no behavioral abnormalities.   Hypertension with adequate control of blood pressure.   Generalized weakness and deconditioning, history of recurrent falls, no fractures with multiple falls over recent years, no falls over past 7+ days.   Chronic major depression, affect flat, mood remains stable.   Will require long-term care placement, anticipate transfer to long-term care over next seven  days, completing physical therapy.      Electronically signed by: Mariangel Arrington MD

## 2021-06-18 NOTE — LETTER
Letter by Mariangel Arrington MD at      Author: Mariangel Arrington MD Service: -- Author Type: --    Filed:  Encounter Date: 12/27/2018 Status: (Other)         Patient: Devi Field   MR Number: [id number]   YOB: 1927   Date of Visit: 12/27/2018     [facility]    Facility:   [facility] [[id number]]   Code Status: POLST AVAILABLE     Reevaluation of 89-year-old female admitted to hospital with frequent falls, no etiology found, stabilized and transferred to TCU for rehabilitation, management of medical problems which include hypertension, dementia.    Review of systems: generalized fatigue. Denies any recent falls. No active joint pain. Mood stable. No cardiac or pulmonary symptomatology. Remainder of 12 point review of systems obtained negative.    Nursing report patient will be moving to long-term care, family has made this decision, patient unaware of final destination of stay.    Exam: vital signs reviewed over past five days. Oriented times two, pleasant and cooperative, mood satisfactory. No facial asymmetry. No pharyngeal erythema. No HJR. S1 and S2 regular. Pulmonary exam without rales rhonchi or wheezes. Abdomen without masses tenderness organomegaly. Periphery with trace venous stasis changes, no pedal edema. Pedal pulses minus one and symmetrical. Modest DJD changes of joints.    Impression and plan:   advanced dementia, no behavioral abnormalities.   Hypertension with satisfactory control of blood pressure, no evidence of orthostatic changes.   Recurrent falls of unclear etiology, patient with a number of falls over many years, no fractures have occurred.   Need for long-term care placement reviewed with nursing staff, patient unable to function independently.      Electronically signed by: Mariangel Arrington MD

## 2021-06-19 NOTE — LETTER
Letter by Mariangel Arrington MD at      Author: Mariangel Arrington MD Service: -- Author Type: --    Filed:  Encounter Date: 3/28/2019 Status: (Other)         Patient: Devi Field   MR Number: [id number]   YOB: 1927   Date of Visit: 3/28/2019     [facility]    Facility:   [facility] [[id number]]   Code Status: POLST AVAILABLE    89-year-old long-term care female is seen for review of multiple medical problems. History of progressive vascular dementia, hypertension, diabetes mellitus previously on oral agents, off diabetic agents for greater than two months, peripheral neuropathy, chronic major depression. Nursing staff report patient intermittently agitated, highly agitated on this date.    Review of systems: patient believes a lounge chair has been taken from her and replaced with an alternate chair. She has pushed the lounger into the hallway, highly upset. Denies recent falls. No headache. No focal neurologic deficits. No chest pain or palpitations. No active joint discomfort. Remainder of 12 point review of systems obtained negative. Roommate reports patient highly agitated, she has noted no falls, behavior typically agitated, at times calm. Nursing staff report patient with out recent falls, behavior periodically agitated.    Exam: vital signs reviewed over past four weeks. Alert, oriented to person only, not to time or place, highly agitated and angry, discussing her chair which she took care of and has now disappeared. Not easily distracted, not easy to calm, eventual calming takes place. No facial asymmetry. Extraocular movements intact. Mucous membranes moist. No pharyngeal erythema. No HJR. S1 and S2 regular with 1/6 systolic murmur. Pulmonary exam without rales rhonchi or wheezes. Abdomen without masses tenderness organomegaly. DJD changes bilateral knees without effusion or focal tenderness. No hand drift. Periphery with venous stasis changes,  trace nonpitting edema. Pedal pulses -2 and symmetrical.    Impression and plan:   progressive vascular dementia, agitation intermittently present, no pharmacological intervention necessary.   Hypertension on lisinopril with adequate control of blood pressure.   Diabetes mellitus on oral agents, recent cessation of Metformin, blood glucose control adequate.   Frequent falls, multifactorial, no injuries have occurred with falls.   Chronic major depression, should mood remain highly irritable consider adjustment of SSRI.   Multiple concerns are reviewed.      Electronically signed by: Mariangel Arrington MD

## 2021-06-19 NOTE — LETTER
Letter by Devin Liang CNP at      Author: Devin Liang CNP Service: -- Author Type: --    Filed:  Encounter Date: 6/6/2019 Status: (Other)         Patient: Devi Field   MR Number: [id number]   YOB: 1927   Date of Visit: 6/6/2019     [facility]    Facility:   [facility] [[id number]]   Code Status: POLST AVAILABLE      CHIEF COMPLAINT/REASON FOR VISIT:  Chief Complaint   Patient presents with   ? Review Of Multiple Medical Conditions       HISTORY:      HPI: Devi is a 89 y.o. female with a history of hypertension, hyperlipidemia, diabetes on metformin, osteoarthritis,'s history of CVA and peripheral neuropathy/DJD.  She presented to the ED on 12/5 and discharged on Keflex and potassium for presumed UTI, was discharged and then returned on 12/7 for fall at home without major injury.  The patient herself is a good historian and denies hitting her head or injury.  She has no subsequent pain today.  She has had increasing falls over the last year and and was seen by PT/OT and told to start using a walker, per her family she has been noncompliant with a walker.  She does have some issues with incontinence and has reduced memory and cognition, she was using a PCA however PCA did not show up the week of her falls on admission and was subsequently fired.  Further workup revealed multifactorial causes for frequent falls including multiple weakness, inactivity, frailty, peripheral neuropathy and visual impairment.    Review: Patient with frequent falls per facility guideline, however she is typically slipping to floor from her chair or kneeling to the floor. No injury with any recent falls. Has not had any head injury. I did review medications, orthostatics, blood sugars extensively when she transferred to LT with unclear etiology for frequent falling. Does occasionally trip on items in the room or forget to use her walker and PT/OT has been  trialed with limited improvement.   Additionally, her bps are variable however have been lower avg /60. On lisinopril 20 and metoprolol 37.5. q am and 50mg q pm.     DM 2: A1c in January 7.2, metformin d/c due to frequent falls as r/o possible hypoglycemia with recheck a1c in late April of 7.8.This is appropriate for age.     Hypertension: on metoprolol and lisinopril with variable bps.     Osteoarthritis/periphral nueropathy: on gabapentin at HS. Consider scheduled apap. Currently denies pain at todays visit.     Past Medical History:   Diagnosis Date   ? ARMD (age related macular degeneration)    ? Back pain    ? Cerebral infarct (H) 2015   ? Diabetes mellitus (H)    ? Diverticulitis    ? Dizziness 10/28/2017   ? DM (diabetes mellitus) (H)    ? Fall 10/28/2017   ? Falls frequently 2018   ? Hyperlipidemia    ? Hypertension    ? Kidney stones    ? Macular degeneration    ? Major depressive disorder, single episode, unspecified    ? Neuropathy (H)    ? OA (osteoarthritis)    ? PN (peripheral neuropathy)    ? Polyneuropathy, unspecified    ? Sciatica    ? Stroke (H)    ? Unspecified dementia without behavioral disturbance              Family History   Problem Relation Age of Onset   ? Lung cancer Mother    ? No Medical Problems Father    ? Leukemia Brother    ? Sudden death Daughter          age 50's spleen ruptured   ? Diabetes Other      Social History     Socioeconomic History   ? Marital status:      Spouse name: Not on file   ? Number of children: Not on file   ? Years of education: Not on file   ? Highest education level: Not on file   Occupational History   ? Not on file   Social Needs   ? Financial resource strain: Not on file   ? Food insecurity:     Worry: Not on file     Inability: Not on file   ? Transportation needs:     Medical: Not on file     Non-medical: Not on file   Tobacco Use   ? Smoking status: Never Smoker   ? Smokeless tobacco: Never Used   Substance and Sexual  Activity   ? Alcohol use: No   ? Drug use: No   ? Sexual activity: Never   Lifestyle   ? Physical activity:     Days per week: Not on file     Minutes per session: Not on file   ? Stress: Not on file   Relationships   ? Social connections:     Talks on phone: Not on file     Gets together: Not on file     Attends Buddhism service: Not on file     Active member of club or organization: Not on file     Attends meetings of clubs or organizations: Not on file     Relationship status: Not on file   ? Intimate partner violence:     Fear of current or ex partner: Not on file     Emotionally abused: Not on file     Physically abused: Not on file     Forced sexual activity: Not on file   Other Topics Concern   ? Not on file   Social History Narrative   ? Not on file         Review of Systems   Constitutional: Negative for activity change, appetite change and fatigue.   HENT: Negative for congestion.    Eyes: Positive for visual disturbance.   Respiratory: Negative for shortness of breath.    Cardiovascular: Negative for chest pain and leg swelling.   Gastrointestinal: Negative for abdominal pain, constipation and diarrhea.   Genitourinary: Negative for dysuria.   Musculoskeletal: Positive for gait problem.   Neurological: Positive for dizziness.   Psychiatric/Behavioral: Positive for confusion.       .  Vitals:    06/10/19 1141   BP: 102/69   Pulse: 62   Temp: 97.4  F (36.3  C)   SpO2: 95%   Weight: 137 lb (62.1 kg)       Physical Exam   Constitutional: She appears well-developed and well-nourished.   HENT:   Head: Atraumatic.   Eyes: No scleral icterus.   Cardiovascular: Normal rate.   Murmur heard.  Pulmonary/Chest: No respiratory distress. She has no wheezes.   Abdominal: There is no tenderness.   Musculoskeletal: She exhibits no edema.   Lymphadenopathy:     She has no cervical adenopathy.   Neurological: She is alert.   Skin: Skin is warm and dry.   Psychiatric:   Flat affect          LABS:   Reviewed.     ASSESSMENT:       ICD-10-CM    1. Fall, sequela W19.XXXS    2. Type 2 diabetes mellitus with diabetic polyneuropathy, without long-term current use of insulin (H) E11.42    3. Essential hypertension I10        PLAN:    Decrease lisinopril to 10mg po daily.   Continue bp checks M,W,F.   I reviewed recent labs and VS x 2 months. Weights are stable.     I have reviewed the facility/SNF care plan/MDS which was done June 2019, including the falls risk, nutrition and pain screening. I also reviewed the current immunizations, and preventive care.. Future cancer screening is not clinically indicated secondary to age/goals of care. Patient's desire to return to the community is not active at this time.       Electronically signed by: Devin Liang CNP

## 2021-06-19 NOTE — LETTER
Letter by Mariangel Arrington MD at      Author: Mariangel Arrington MD Service: -- Author Type: --    Filed:  Encounter Date: 8/6/2019 Status: (Other)         Patient: Devi Field   MR Number: [id number]   YOB: 1927   Date of Visit: 8/6/2019     [facility]    Facility:   [facility] [[id number]]   Code Status: POLST AVAILABLE    89-year-old female is seen for follow up evaluation and discharge planning from [facility]. History of progressive dementia, behavioral irregularities with recent combative behavior with roommates, has been residing in a separate room without behavioral abnormalities noted. History significant for diabetes mellitus previously on Metformin, off Metformin at this time, hypertension on lisinopril 20 mg Q day, metoprolol 37.5 a.m. 50 p.m., recurrent falls, numerous falls have occurred during long-term care stay, no injuries noted. History of hypokalemia on replacement. Bunions of feet, recently acquired diabetic shoes which fit poorly and created blister of  right great superior toe, shoes to be returned, wearing open toed shoes at present. No cardiac or pulmonary symptomatology. Will be transferring to memory care unit.    Review of systems: denies current headache. Mildly anxious regarding transfer to new facility, excited however regarding a private room. Denies cardiac or pulmonary symptoms. No fever sweats or chills. No current foot pain. Previous superficial blister right great toe has resolved. No active joint discomfort at present, frequently has knee pain. Remainder of 12 point review of systems obtained negative.    Exam: vital signs reviewed over past one month, infrequent elevation of systolic blood pressure to 160, overall satisfactory readings. No facial asymmetry. Affect flat, conversant. Mucous membranes moist. No HJR or carotid bruits. S1 and S2 regular with 2/6 systolic murmur. Pulmonary exam without rales  rhonchi or wheezes. Abdomen without masses tenderness organomegaly. DJD changes bilateral knees without acute inflammatory change or focal tenderness. Venous stasis changes bilateral lower extremities, no pitting edema. Pedal pulses diminished and symmetrical. No hand drift.    Impression and plan:   hypertension with satisfactory control of blood pressure.   Frequent falls, no acute injuries over past one year.   Advanced dementia, intermittent behavioral irregularities, no behavioral issues over past two weeks.   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   Diabetes mellitus previously on Metformin, off Metformin with satisfactory control of blood glucose.   Chronic major depression, continues SSRI, mood currently stable.   Discharge medications are as follows:  Current Outpatient Medications:   ?  aspirin 81 MG EC tablet, Take 81 mg by mouth daily., Disp: , Rfl:   ?  cholecalciferol, vitamin D3, 1,000 unit tablet, Take 1,000 Units by mouth every morning., Disp: , Rfl:   ?  FLUoxetine (PROZAC) 20 MG tablet, Take 20 mg by mouth every morning., Disp: , Rfl:   ?  gabapentin (NEURONTIN) 100 MG capsule, Take 100 mg by mouth at bedtime .   , Disp: , Rfl:   ?  lisinopril (PRINIVIL,ZESTRIL) 20 MG tablet, Take 20 mg by mouth daily .   , Disp: , Rfl:   ?  metoprolol succinate (TOPROL-XL) 25 MG, Take 37.5 mg by mouth 2 (two) times a day .   , Disp: , Rfl:   ?  potassium chloride (KLOR-CON) 10 MEQ CR tablet, Take 10 mEq by mouth 2 (two) times a day., Disp: , Rfl:   ?  simvastatin (ZOCOR) 20 MG tablet, Take 20 mg by mouth every evening., Disp: , Rfl:  lisinopril at 20 mg Q day, atenolol 37.5/50. On transfer to memory care patient will require supervised care, registered nurse, physical therapy and occupational therapy to maintain strength. Discharge orders are signed. Total time of discharge evaluation greater than 30 minutes, greater than 50%  of time spent in coordination of care and counseling.      Electronically signed by: Mariangel Arrington MD
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
## 2021-06-19 NOTE — LETTER
Letter by Devin Liang CNP at      Author: Devin Liang CNP Service: -- Author Type: --    Filed:  Encounter Date: 4/25/2019 Status: (Other)         Patient: Devi Field   MR Number: [id number]   YOB: 1927   Date of Visit: 4/25/2019     [facility]    Facility:   [facility] [[id number]]   Code Status: POLST AVAILABLE      CHIEF COMPLAINT/REASON FOR VISIT:  Chief Complaint   Patient presents with   ? Problem Visit       HISTORY:      HPI: Devi is a 89 y.o. female with a history of hypertension, hyperlipidemia, diabetes on metformin, osteoarthritis,'s history of CVA and peripheral neuropathy/DJD.  She presented to the ED on 12/5 and discharged on Keflex and potassium for presumed UTI, was discharged and then returned on 12/7 for fall at home without major injury.  The patient herself is a good historian and denies hitting her head or injury.  She has no subsequent pain today.  She has had increasing falls over the last year and and was seen by PT/OT and told to start using a walker, per her family she has been noncompliant with a walker.  She does have some issues with incontinence and has reduced memory and cognition, she was using a PCA however PCA did not show up the week of her falls on admission and was subsequently fired.  Further workup revealed multifactorial causes for frequent falls including multiple weakness, inactivity, frailty, peripheral neuropathy and visual impairment.    LTC:     Today: Patient seen for follow-up of right elbow cellulitis that was started on doxycycline 9 days ago.  Area remains red, scant purulent drainage, some mild tenderness however this is improved from her previous visit.  Given her diabetes and continued tenderness extend cycling course.  Patient also complaining that pain is worse at nighttime if she does not have a bandage in place.    DM 2: On metformin 500 with most recent A1c of 7.2; fine for  her age. Metformin d/c February.      Hypertension: on metoprolol and lisinopril with variable bps. Pulse stable. Blood pressures frequently up to 160/90s however most often occurring on night shift or early morning prior to medications. Daytime bps have been 110//80.    Osteoarthritis/periphral nueropathy: on gabapentin at HS. Continue tylenol 1000mg three times a day scheduled.     Past Medical History:   Diagnosis Date   ? ARMD (age related macular degeneration)    ? Back pain    ? Cerebral infarct (H) 2015   ? Diabetes mellitus (H)    ? Diverticulitis    ? Dizziness 10/28/2017   ? DM (diabetes mellitus) (H)    ? Fall 10/28/2017   ? Falls frequently 2018   ? Hyperlipidemia    ? Hypertension    ? Kidney stones    ? Macular degeneration    ? Major depressive disorder, single episode, unspecified    ? Neuropathy (H)    ? OA (osteoarthritis)    ? PN (peripheral neuropathy)    ? Polyneuropathy, unspecified    ? Sciatica    ? Stroke (H)    ? Unspecified dementia without behavioral disturbance              Family History   Problem Relation Age of Onset   ? Lung cancer Mother    ? No Medical Problems Father    ? Leukemia Brother    ? Sudden death Daughter          age 50's spleen ruptured   ? Diabetes Other      Social History     Socioeconomic History   ? Marital status:      Spouse name: Not on file   ? Number of children: Not on file   ? Years of education: Not on file   ? Highest education level: Not on file   Occupational History   ? Not on file   Social Needs   ? Financial resource strain: Not on file   ? Food insecurity:     Worry: Not on file     Inability: Not on file   ? Transportation needs:     Medical: Not on file     Non-medical: Not on file   Tobacco Use   ? Smoking status: Never Smoker   ? Smokeless tobacco: Never Used   Substance and Sexual Activity   ? Alcohol use: No   ? Drug use: No   ? Sexual activity: Never   Lifestyle   ? Physical activity:     Days per week: Not on  file     Minutes per session: Not on file   ? Stress: Not on file   Relationships   ? Social connections:     Talks on phone: Not on file     Gets together: Not on file     Attends Yarsanism service: Not on file     Active member of club or organization: Not on file     Attends meetings of clubs or organizations: Not on file     Relationship status: Not on file   ? Intimate partner violence:     Fear of current or ex partner: Not on file     Emotionally abused: Not on file     Physically abused: Not on file     Forced sexual activity: Not on file   Other Topics Concern   ? Not on file   Social History Narrative   ? Not on file         Review of Systems   Constitutional: Negative for activity change, appetite change and fatigue.   HENT: Negative for congestion.    Respiratory: Negative for shortness of breath.    Cardiovascular: Negative for chest pain and leg swelling.   Gastrointestinal: Negative for abdominal pain, constipation and diarrhea.   Genitourinary: Negative for dysuria.   Musculoskeletal: Positive for arthralgias, back pain and gait problem.   Skin: Positive for wound.   Psychiatric/Behavioral: Positive for confusion.     .  Vitals:    04/26/19 1344   BP: 150/84   Pulse: 65   Temp: 97  F (36.1  C)   SpO2: 95%   Weight: 139 lb (63 kg)       Physical Exam   Constitutional: She appears well-developed and well-nourished.   HENT:   Head: Atraumatic.   Eyes: No scleral icterus.   Cardiovascular: Normal rate.   Murmur heard.  Pulmonary/Chest: No respiratory distress. She has no wheezes.   Abdominal: There is no tenderness.   Musculoskeletal: She exhibits no edema.   Lymphadenopathy:     She has no cervical adenopathy.   Neurological: She is alert.   Skin: Skin is warm and dry. There is erythema.   Right elbow with small, one cm cut with scant clear drainage, surrounding erythema 2 x 2 cm around elbow and wound.    Psychiatric:   Flat affect          LABS:   Reviewed.     ASSESSMENT:      ICD-10-CM    1.  Cellulitis of right upper extremity L03.113    2. Type 2 diabetes mellitus with diabetic polyneuropathy, without long-term current use of insulin (H) E11.42        PLAN:    Extend doxycycline 100 mg p.o. twice daily times 7 more days for total of 14-day course.  Continue wound care with cleanse with normal saline, apply bacitracin and apply 2 x 2 gauze to open area once daily.  Please ensure patient is going to bed with a gauze pad on right elbow for wound protection and comfort.    Electronically signed by: Devin Liang CNP

## 2021-06-19 NOTE — LETTER
Letter by Devin Liang CNP at      Author: Devin Liang CNP Service: -- Author Type: --    Filed:  Encounter Date: 4/15/2019 Status: (Other)         Patient: Devi Field   MR Number: [id number]   YOB: 1927   Date of Visit: 4/15/2019     [facility]    Facility:   [facility] [[id number]]   Code Status: POLST AVAILABLE      CHIEF COMPLAINT/REASON FOR VISIT:  Chief Complaint   Patient presents with   ? Problem Visit     Right arm redness, drainage        HISTORY:      HPI: Devi is a 89 y.o. female with a history of hypertension, hyperlipidemia, diabetes on metformin, osteoarthritis,'s history of CVA and peripheral neuropathy/DJD.  She presented to the ED on 12/5 and discharged on Keflex and potassium for presumed UTI, was discharged and then returned on 12/7 for fall at home without major injury.  The patient herself is a good historian and denies hitting her head or injury.  She has no subsequent pain today.  She has had increasing falls over the last year and and was seen by PT/OT and told to start using a walker, per her family she has been noncompliant with a walker.  She does have some issues with incontinence and has reduced memory and cognition, she was using a PCA however PCA did not show up the week of her falls on admission and was subsequently fired.  Further workup revealed multifactorial causes for frequent falls including multiple weakness, inactivity, frailty, peripheral neuropathy and visual impairment.    LTC:     Today: Patient seen per nursing for new wound to the right elbow that began after a fall that occurred on Saturday evening.  The nursing staff has been cleansing the area with normal saline and monitoring and redness has gotten worse, she was up most of the night with pain to the right elbow and cannot place any pressure on it without discomfort.  I examined her skin, there is erythema surrounding the entire elbow,  a small 1 cm cut and tenderness and warmth.    DM 2: On metformin 500 with most recent A1c of 7.2; fine for her age. Metformin d/c February.      Hypertension: on metoprolol and lisinopril with variable bps. Pulse stable. Blood pressures frequently up to 160/90s however most often occurring on night shift or early morning prior to medications. Daytime bps have been 110//80.    Osteoarthritis/periphral nueropathy: on gabapentin at HS. Continue tylenol 1000mg three times a day scheduled.     Past Medical History:   Diagnosis Date   ? ARMD (age related macular degeneration)    ? Back pain    ? Cerebral infarct (H) 2015   ? Diabetes mellitus (H)    ? Diverticulitis    ? Dizziness 10/28/2017   ? DM (diabetes mellitus) (H)    ? Fall 10/28/2017   ? Falls frequently 2018   ? Hyperlipidemia    ? Hypertension    ? Kidney stones    ? Macular degeneration    ? Major depressive disorder, single episode, unspecified    ? Neuropathy (H)    ? OA (osteoarthritis)    ? PN (peripheral neuropathy)    ? Polyneuropathy, unspecified    ? Sciatica    ? Stroke (H)    ? Unspecified dementia without behavioral disturbance              Family History   Problem Relation Age of Onset   ? Lung cancer Mother    ? No Medical Problems Father    ? Leukemia Brother    ? Sudden death Daughter          age 50's spleen ruptured   ? Diabetes Other      Social History     Socioeconomic History   ? Marital status:      Spouse name: Not on file   ? Number of children: Not on file   ? Years of education: Not on file   ? Highest education level: Not on file   Occupational History   ? Not on file   Social Needs   ? Financial resource strain: Not on file   ? Food insecurity:     Worry: Not on file     Inability: Not on file   ? Transportation needs:     Medical: Not on file     Non-medical: Not on file   Tobacco Use   ? Smoking status: Never Smoker   ? Smokeless tobacco: Never Used   Substance and Sexual Activity   ? Alcohol use: No    ? Drug use: No   ? Sexual activity: Never   Lifestyle   ? Physical activity:     Days per week: Not on file     Minutes per session: Not on file   ? Stress: Not on file   Relationships   ? Social connections:     Talks on phone: Not on file     Gets together: Not on file     Attends Muslim service: Not on file     Active member of club or organization: Not on file     Attends meetings of clubs or organizations: Not on file     Relationship status: Not on file   ? Intimate partner violence:     Fear of current or ex partner: Not on file     Emotionally abused: Not on file     Physically abused: Not on file     Forced sexual activity: Not on file   Other Topics Concern   ? Not on file   Social History Narrative   ? Not on file         Review of Systems   Constitutional: Negative for activity change, appetite change and fatigue.   HENT: Negative for congestion.    Respiratory: Negative for shortness of breath.    Cardiovascular: Negative for chest pain and leg swelling.   Gastrointestinal: Negative for abdominal pain, constipation and diarrhea.   Genitourinary: Negative for dysuria.   Musculoskeletal: Positive for arthralgias, back pain and gait problem.   Skin: Positive for wound.   Psychiatric/Behavioral: Positive for confusion.     .  Vitals:    04/18/19 0909   BP: 158/90   Pulse: 72   Temp: 98.2  F (36.8  C)   SpO2: 96%   Weight: 139 lb (63 kg)       Physical Exam   Constitutional: She appears well-developed and well-nourished.   HENT:   Head: Atraumatic.   Eyes: No scleral icterus.   Cardiovascular: Normal rate.   Murmur heard.  Pulmonary/Chest: No respiratory distress. She has no wheezes.   Abdominal: There is no tenderness.   Musculoskeletal: She exhibits no edema.   Lymphadenopathy:     She has no cervical adenopathy.   Neurological: She is alert.   Skin: Skin is warm and dry. There is erythema.   Right elbow with small, one cm cut with scant clear drainage, surrounding erythema 2 x 2 cm around elbow and  wound, with warmth and tenderness.   Psychiatric:   Flat affect          LABS:   Reviewed.     ASSESSMENT:      ICD-10-CM    1. Fall, sequela W19.XXXS    2. Cellulitis of right upper extremity L03.113        PLAN:    Right arm injury at the elbow with small open area, redness, warmth and tenderness after recent fall over the weekend.  Start doxycycline 100 mg p.o. twice daily times 7 days.  Continue wound care with cleanse with normal saline, apply bacitracin and apply 2 x 2 gauze to open area once daily.    Electronically signed by: Devin Liang, CNP

## 2021-06-19 NOTE — LETTER
Letter by Mariangel Arrington MD at      Author: Mariangel Arrington MD Service: -- Author Type: --    Filed:  Encounter Date: 7/30/2019 Status: (Other)         Patient: Devi Field   MR Number: [id number]   YOB: 1927   Date of Visit: 7/30/2019     [facility]    Facility:   [facility] [[id number]]   Code Status: POLST AVAILABLE    89-year-old female is seen at request of nursing staff for fall that occurred this morning. Residing in TCU area, recent disturbances with roommates necessitating transfer to private room. History of vascular dementia, chronic depression, intermittent agitation, hyper tension and frequent falls.    Review of systems: patient reports falling this morning. States she simply lost her balance after getting out of bed, denies head trauma. Unaware of injury to limbs. No focal neurologic symptoms. Unaware of new onset dizziness. Denies chest pain or palpitations. No residual pain. Continues to not wear recently obtained orthopedic shoes which caused an abrasion superior aspect right great toe. Wearing open toed footwear which she has used for some time. Remainder of 12 point review of systems obtained negative.    Nursing staff report patient found on floor this morning, small abrasion lower back, superficial, no other associated findings.    Exam: temperature 97.8, pulse 67, respiratory 18, blood pressure 145/90, 02 97% on room air. Oriented to person and place, not to time, arranging items in her closet, standing up right without evidence of imbalance. No facial asymmetry. Mucous membranes moist. No HJR or carotid bruits. S1 and S2 regular. Pulmonary exam without rales or wheezes. Abdomen without masses tenderness. Periphery with venous stasis changes, no pitting edema. Pedal pulses diminished and symmetrical. Small scabbed superior aspect right great toe, no surrounding erythema.    Impression and plan:   vascular dementia, no recent  behavioral abnormalities.   Chronic hypertension with adequate control of blood pressure, hypotensive episodes not appreciated.   History of recurrent falls, fall this morning being found on floor, no evidence of trauma, small abrasion back covered with dressing, reported by nursing to be superficial.   Chronic depression continuing Prozac.   Recent behavioral abnormalities resolved.   Continue observation of neurologic and behavioral status.      Electronically signed by: Mariangel Arrington MD

## 2021-06-22 NOTE — PROGRESS NOTES
[facility]    Facility:   [facility] [[id number]]   Code Status: POLST AVAILABLE  Reassessment of 89-year-old female transported to emergency room following a fall, history of multiple falls, deconditioning, hypertension, progressive dementia, chronic depression, due to multiple falls transferred to TCU for rehabilitation and management of medical problems.    Review of systems: fatigue present at all times. Generalized weakness present. Denies current dizziness. Unaware of orthostatic changes. Unable to fully describe episodes of lightheadedness or dizziness. Prefers to sleep. Finds physical therapy to be tiring. Remainder of 12 point review of systems obtained negative.    Exam: blood pressure 164/84, afebrile, pulse 82 and regular, respiratory 16. Vague in presentation, lying in bed. In no apparent distress. No facial asymmetry. Answers all questions appropriately. No carotid bruits or HJR. Thyroid midline and regular. S1 and S2 regular with 1/6 systolic murmur. Pulmonary exam without rales rhonchi or wheezes. Abdomen without masses tenderness organomegaly. Periphery with venous stasis changes, trace nonpitting edema/lymphedema. Pedal pulses diminished and symmetrical. Joints with mild DJD changes, no acute inflammatory change.    Impression and plan:   vascular dementia, no behavioral abnormalities.   History of recurrent falls, multifactorial etiology, deconditioning with need for rehabilitation.   Hypertension, continues lisinopril, blood pressure control adequate, monitor for orthostatic changes in view of recurrent falls.   Chronic major depression, mood stable.        Electronically signed by: Mariangel Arrington MD

## 2021-06-22 NOTE — PROGRESS NOTES
[facility]    Facility:   [facility] [[id number]]   Code Status: POLST AVAILABLE    Reassessment of 89-year-old female admitted to hospital with recurrent falls, no fractures, stabilized and transferred to TCU for rehabilitation, management of medical problems which include hypertension and major depression.    Review of systems: fatigue present at all times. Denies new falls. No active pain symptoms. No exertional chest discomfort. Somewhat aware of memory loss. Denies acute confusion. No focal neurologic deficits. Remainder of 12 point review of systems obtained negative.    Exam: alert, oriented times one, lying in bed, drowsy, cooperative and pleasant. Redundant in conversation. In no apparent distress. No pharyngeal erythema. No facial asymmetry. No carotid bruits or HJR. Thyroid midline and regular. Pulmonary exam without rales rhonchi or wheezes. S1 and S2 regular with 2/6 systolic murmur. Abdomen without masses tenderness organomegaly. Modest DJD changes of digital joints and knees, no acute inflammatory change. Venous stasis changes lower extremities, no pedal edema. Pedal pulses symmetrical.    Impression and plan:   advanced dementia, no behavioral abnormalities.   History of major depression, mood remains stable.   Hypertension with adequate control of blood pressure.   History of multiple falls, no bony injury, no falls over past five days.   Disposition, patient will require long-term care placement in view of advanced memory deficit and generalized decline in status over recent months.   Medications reviewed.      Electronically signed by: Mariangel Arrington MD

## 2021-06-22 NOTE — PROGRESS NOTES
[facility]    Facility:   [facility] [[id number]]   Code Status: POLST AVAILABLE     Reevaluation of 89-year-old female admitted to hospital with frequent falls, no etiology found, stabilized and transferred to TCU for rehabilitation, management of medical problems which include hypertension, dementia.    Review of systems: generalized fatigue. Denies any recent falls. No active joint pain. Mood stable. No cardiac or pulmonary symptomatology. Remainder of 12 point review of systems obtained negative.    Nursing report patient will be moving to long-term care, family has made this decision, patient unaware of final destination of stay.    Exam: vital signs reviewed over past five days. Oriented times two, pleasant and cooperative, mood satisfactory. No facial asymmetry. No pharyngeal erythema. No HJR. S1 and S2 regular. Pulmonary exam without rales rhonchi or wheezes. Abdomen without masses tenderness organomegaly. Periphery with trace venous stasis changes, no pedal edema. Pedal pulses minus one and symmetrical. Modest DJD changes of joints.    Impression and plan:   advanced dementia, no behavioral abnormalities.   Hypertension with satisfactory control of blood pressure, no evidence of orthostatic changes.   Recurrent falls of unclear etiology, patient with a number of falls over many years, no fractures have occurred.   Need for long-term care placement reviewed with nursing staff, patient unable to function independently.      Electronically signed by: Mariangel Arrington MD

## 2021-06-22 NOTE — PROGRESS NOTES
[facility]    Facility:   [facility] [[id number]]   Code Status: POLST AVAILABLE      CHIEF COMPLAINT/REASON FOR VISIT:  Chief Complaint   Patient presents with     Review Of Multiple Medical Conditions       HISTORY:      HPI: Devi is a 89 y.o. female with a history of hypertension, hyperlipidemia, diabetes on metformin, osteoarthritis,'s history of CVA and peripheral neuropathy/DJD.  She presented to the ED on 12/5 and discharged on Keflex and potassium for presumed UTI, was discharged and then returned on 12/7 for fall at home without major injury.  The patient herself is a good historian and denies hitting her head or injury.  She has no subsequent pain today.  She has had increasing falls over the last year and and was seen by PT/OT and told to start using a walker, per her family she has been noncompliant with a walker.  She does have some issues with incontinence and has reduced memory and cognition, she was using a PCA however PCA did not show up the week of her falls on admission and was subsequently fired.  Further workup revealed multifactorial causes for frequent falls including multiple weakness, inactivity, frailty, peripheral neuropathy and visual impairment.    TCU:   Continues to have variable bps between 118//90. Attending therapies, using a walker to ambulate throughout the facility, has had multiple falls without injury in TCU; orthos recorded and have been variable: few with drops >20mmhg, some were stable; no changes to meds as of yet. On lisinopril and metoprolol.     DM 2: On metformin with most recent A1c of 7.7.    Hypertension: on metoprolol and lisinopril with variable bps.     Osteoarthritis/periphral nueropathy: on gabapentin at HS. Consider scheduled apap. Currently denies pain at todays visit.     Past Medical History:   Diagnosis Date     ARMD (age related macular degeneration)      Back pain      Cerebral infarct (H) 04/14/2015      Diabetes mellitus (H)      Diverticulitis      Dizziness 10/28/2017     DM (diabetes mellitus) (H)      Fall 10/28/2017     Falls frequently 2018     Hyperlipidemia      Hypertension      Kidney stones      Macular degeneration      Major depressive disorder, single episode, unspecified      Neuropathy (H)      OA (osteoarthritis)      PN (peripheral neuropathy)      Polyneuropathy, unspecified      Sciatica      Stroke (H)      Unspecified dementia without behavioral disturbance              Family History   Problem Relation Age of Onset     Lung cancer Mother      No Medical Problems Father      Leukemia Brother      Sudden death Daughter          age 50's spleen ruptured     Diabetes Other      Social History     Socioeconomic History     Marital status:      Spouse name: Not on file     Number of children: Not on file     Years of education: Not on file     Highest education level: Not on file   Social Needs     Financial resource strain: Not on file     Food insecurity - worry: Not on file     Food insecurity - inability: Not on file     Transportation needs - medical: Not on file     Transportation needs - non-medical: Not on file   Occupational History     Not on file   Tobacco Use     Smoking status: Never Smoker     Smokeless tobacco: Never Used   Substance and Sexual Activity     Alcohol use: No     Drug use: No     Sexual activity: No   Other Topics Concern     Not on file   Social History Narrative     Not on file         Review of Systems   Constitutional: Negative for activity change, appetite change and fatigue.   HENT: Negative for congestion.    Eyes: Positive for visual disturbance.   Respiratory: Negative for shortness of breath.    Cardiovascular: Negative for chest pain and leg swelling.   Gastrointestinal: Negative for abdominal pain, constipation and diarrhea.   Genitourinary: Negative for dysuria.   Musculoskeletal: Positive for gait problem.   Neurological: Positive for  dizziness.   Psychiatric/Behavioral: Positive for confusion.       .  Vitals:    12/30/18 1748   BP: 147/81   Pulse: 81   Temp: 97  F (36.1  C)       Physical Exam   Constitutional: She appears well-developed and well-nourished.   HENT:   Head: Atraumatic.   Eyes: No scleral icterus.   Cardiovascular: Normal rate.   Murmur heard.  Pulmonary/Chest: No respiratory distress. She has no wheezes.   Abdominal: There is no tenderness.   Musculoskeletal: She exhibits no edema.   Lymphadenopathy:     She has no cervical adenopathy.   Neurological: She is alert.   Skin: Skin is warm and dry.   Psychiatric:   Flat affect          LABS:   Reviewed.     ASSESSMENT:      ICD-10-CM    1. Essential hypertension I10    2. Type 2 diabetes mellitus with diabetic polyneuropathy, without long-term current use of insulin (H) E11.42    3. Fall, sequela W19.XXXS    4. Neuropathy (H) G62.9        PLAN:    Continue to monitor bp and dizziness. Reminders for ambulation to use call light. Roommate helpful for reminder.         Electronically signed by: Devin Liang, CNP

## 2021-06-22 NOTE — PROGRESS NOTES
[facility]    Facility:   [facility] [[id number]]   Code Status: POLST AVAILABLE      CHIEF COMPLAINT/REASON FOR VISIT:  Chief Complaint   Patient presents with     Review Of Multiple Medical Conditions       HISTORY:      HPI: Devi is a 89 y.o. female with a history of hypertension, hyperlipidemia, diabetes on metformin, osteoarthritis,'s history of CVA and peripheral neuropathy/DJD.  She presented to the ED on 12/5 and discharged on Keflex and potassium for presumed UTI, was discharged and then returned on 12/7 for fall at home without major injury.  The patient herself is a good historian and denies hitting her head or injury.  She has no subsequent pain today.  She has had increasing falls over the last year and and was seen by PT/OT and told to start using a walker, per her family she has been noncompliant with a walker.  She does have some issues with incontinence and has reduced memory and cognition, she was using a PCA however PCA did not show up the week of her falls on admission and was subsequently fired.  Further workup revealed multifactorial causes for frequent falls including multiple weakness, inactivity, frailty, peripheral neuropathy and visual impairment.    TCU:   Weakness and falls: Attending therapies, using a walker to ambulate throughout the facility, has had multiple falls without injury in TCU; orthos recorded and have been variable: few with drops >20mmhg, some were stable; no changes to meds as of yet. On lisinopril and metoprolol.     DM 2: On metformin with most recent A1c of 7.7.    Hypertension: on metoprolol and lisinopril with variable bps.     Osteoarthritis/periphral nueropathy: on gabapentin at HS. Consider scheduled apap.     Past Medical History:   Diagnosis Date     ARMD (age related macular degeneration)      Back pain      Cerebral infarct (H) 04/14/2015     Diabetes mellitus (H)      Diverticulitis      Dizziness 10/28/2017     DM  (diabetes mellitus) (H)      Fall 10/28/2017     Falls frequently 2018     Hyperlipidemia      Hypertension      Kidney stones      Macular degeneration      Major depressive disorder, single episode, unspecified      Neuropathy (H)      OA (osteoarthritis)      PN (peripheral neuropathy)      Polyneuropathy, unspecified      Sciatica      Stroke (H)      Unspecified dementia without behavioral disturbance              Family History   Problem Relation Age of Onset     Lung cancer Mother      No Medical Problems Father      Leukemia Brother      Sudden death Daughter          age 50's spleen ruptured     Diabetes Other      Social History     Socioeconomic History     Marital status:      Spouse name: Not on file     Number of children: Not on file     Years of education: Not on file     Highest education level: Not on file   Social Needs     Financial resource strain: Not on file     Food insecurity - worry: Not on file     Food insecurity - inability: Not on file     Transportation needs - medical: Not on file     Transportation needs - non-medical: Not on file   Occupational History     Not on file   Tobacco Use     Smoking status: Never Smoker     Smokeless tobacco: Never Used   Substance and Sexual Activity     Alcohol use: No     Drug use: No     Sexual activity: No   Other Topics Concern     Not on file   Social History Narrative     Not on file         Review of Systems   Constitutional: Negative for activity change, appetite change and fatigue.   HENT: Negative for congestion.    Eyes: Positive for visual disturbance.   Respiratory: Negative for shortness of breath.    Cardiovascular: Negative for chest pain and leg swelling.   Gastrointestinal: Negative for abdominal pain, constipation and diarrhea.   Genitourinary: Negative for dysuria.   Musculoskeletal: Positive for gait problem.   Psychiatric/Behavioral: Positive for confusion.       .  Vitals:    18 1010   BP: 153/85   Pulse: 74    Temp: 98  F (36.7  C)   Weight: 135 lb (61.2 kg)       Physical Exam   Constitutional: She appears well-developed and well-nourished.   HENT:   Head: Atraumatic.   Eyes: No scleral icterus.   Cardiovascular: Normal rate.   Murmur heard.  Pulmonary/Chest: No respiratory distress. She has no wheezes.   Abdominal: There is no tenderness.   Musculoskeletal: She exhibits no edema.   Lymphadenopathy:     She has no cervical adenopathy.   Neurological: She is alert.   Skin: Skin is warm and dry.   Psychiatric:   Flat affect          LABS:   Reviewed.     ASSESSMENT:      ICD-10-CM    1. Essential hypertension I10    2. Cerebrovascular accident (CVA), unspecified mechanism (H) I63.9    3. Type 2 diabetes mellitus with diabetic polyneuropathy, without long-term current use of insulin (H) E11.42    4. Neuropathy (H) G62.9    5. Fall, sequela W19.XXXS        PLAN:    Monitor bps closely. Start apap 650mg po three times a day and 650mg two times a day prn.         Electronically signed by: Devin Liang CNP

## 2021-06-22 NOTE — PROGRESS NOTES
[facility]    Facility:   [facility] [[id number]]   Code Status: POLST AVAILABLE    Reevaluation of 89-year-old female admitted to hospital with multiple falls, history of chronic falls, no bony injury, advancing dementia, peripheral neuropathy, hypertension, chronic major depression, diabetes mellitus on oral agents.    Review of systems: denies pain. Fatigue present at all times. No focal neurologic symptoms. History of chronic dizziness, no dizziness since admission to TCU. States she is 89 years old and has never broken a bone, denies active joint pain. Remainder of 12 point review of systems obtained negative. Nursing staff report patient will be staying in long-term care in view of dementia and recurrent falls.    Exam: vital signs reviewed over past 48 hours. Alert, oriented times one, pleasant and cooperative. No facial asymmetry. Pharynx without erythema. No carotid bruits or HJR. S1 and S2 regular with 1/6 systolic murmur. Pulmonary exam without rales rhonchi or wheezes. Abdomen without masses tenderness organomegaly. DJD changes knees without acute inflammatory change, no focal tenderness. No hand drift. Venous stasis changes lower extremities.    Impression and plan:   recurrent falls, cause of falls unclear, no history of fracture.   Advancing dementia, no behavioral abnormalities.   Peripheral neuropathy on gabapentin, non-painful, neuropathy contributing to falls.   Chronic major depression, mood remains stable.   Medications are reviewed, concerns reviewed with nursing staff.      Electronically signed by: Mariangel Arrington MD

## 2021-06-22 NOTE — PROGRESS NOTES
[facility]    Facility:   [facility] [[id number]]   Code Status: POLST AVAILABLE    Admission evaluation to TCU of 89 year old female admitted to hospital following a fall at [facility]. History of hypertension, diabetes mellitus on oral agents, DJD of multiple joints, macular degeneration with decreased vision, chronic major depression, progressive dementia, previous history of CVA without focal neurologic deficits, no acute bony injury identified,  multiple falls attributed to  multifactorial etiology, generalized deconditioning and weakness present, transferred to TCU for rehabilitation, observation of clinical status, management of multiple medical problems.    Past medical history, current medical problem list, drug allergies, current medication list, social history, family history, code status reviewed in epic.    Review of systems: somewhat aware of mild memory deficit. Reports recurrent falls, denies loss of consciousness, states she believes she ? trips, she always falls backward, unaware of associated symptoms prior to fall. Unaware of orthostatic blood pressure changes, no seizure activity, no drop attacks. Denies chest pain or palpitations. Fatigue present at all times. No acute joint discomfort. No focal neurologic deficits. Numbness of bilateral feet. Decreased vision. Remainder of 12 point review of systems obtained negative.    Exam: lying supine in bed, able to sit upright without difficulty, oriented times two, in no apparent distress, cooperative and pleasant. Blood pressure 136/72, pulse 68, respiratory 16, 02 97%. Vague in discussion. No facial asymmetry. Mucous membranes moist. Skin turgor intact. No carotid bruits or HJR. PERRLA EOMI. No hand drift or tremor. Thyroid midline and regular. S1 and S2 regular. Pulmonary exam without rales rhonchi or wheezes. Abdomen without masses tenderness organomegaly. Periphery with trace venous stasis  changes, no pitting edema. No calf tenderness or swelling. Modest DJD changes of digital joints and knees, no acute inflammatory joint changes.    Impression and plan:   multiple falls, etiology of falls unclear, nothing to suggest acute neurologic event seizure activity or cardiac syncope.   Continues on lisinopril for hypertension, monitor for orthostatic changes.   Mild dementia, cognition progressively impaired over past one year.   Previous history of CVA without focal neurologic deficits.   History of major depression continuing on SSRI, mood stable.   DJD modest, no current pain symptomatology.   Diabetes mellitus on oral agents, blood glucose control satisfactory.   Macular degeneration with decreased vision, visual impairment potentially contributing to falls.   Multiple medical issues are reviewed, outside medical records reviewed, medications reviewed, discussion with patient and nursing staff, proceed with rehabilitation, cautious observation, high risk for falls.      Electronically signed by: Mariangel Arrington MD

## 2021-06-22 NOTE — PROGRESS NOTES
[facility]    Facility:   [facility] [[id number]]   Code Status: POLST AVAILABLE      CHIEF COMPLAINT/REASON FOR VISIT:  Chief Complaint   Patient presents with     Review Of Multiple Medical Conditions       HISTORY:      HPI: Devi is a 89 y.o. female with a history of hypertension, hyperlipidemia, diabetes on metformin, osteoarthritis,'s history of CVA and peripheral neuropathy/DJD.  She presented to the ED on 12/5 and discharged on Keflex and potassium for presumed UTI, was discharged and then returned on 12/7 for fall at home without major injury.  The patient herself is a good historian and denies hitting her head or injury.  She has no subsequent pain today.  She has had increasing falls over the last year and and was seen by PT/OT and told to start using a walker, per her family she has been noncompliant with a walker.  She does have some issues with incontinence and has reduced memory and cognition, she was using a PCA however PCA did not show up the week of her falls on admission and was subsequently fired.  Further workup revealed multifactorial causes for frequent falls including multiple weakness, inactivity, frailty, peripheral neuropathy and visual impairment.    TCU:   Weakness and falls: Attending therapies, using a walker to ambulate throughout the facility, no falls as of yet during TCU stay.    DM 2: On metformin with most recent A1c of 7.7.    Hypertension: on metoprolol     Osteoarthritis/periphral nueropathy: on gabapentin at HS. Consider scheduled apap.     Past Medical History:   Diagnosis Date     ARMD (age related macular degeneration)      Back pain      Cerebral infarct (H) 04/14/2015     Diabetes mellitus (H)      Diverticulitis      Dizziness 10/28/2017     DM (diabetes mellitus) (H)      Fall 10/28/2017     Falls frequently 12/07/2018     Hyperlipidemia      Hypertension      Kidney stones      Macular degeneration      Major depressive  disorder, single episode, unspecified      Neuropathy (H)      OA (osteoarthritis)      PN (peripheral neuropathy)      Polyneuropathy, unspecified      Sciatica      Stroke (H)      Unspecified dementia without behavioral disturbance              Family History   Problem Relation Age of Onset     Lung cancer Mother      No Medical Problems Father      Leukemia Brother      Sudden death Daughter          age 50's spleen ruptured     Diabetes Other      Social History     Socioeconomic History     Marital status:      Spouse name: Not on file     Number of children: Not on file     Years of education: Not on file     Highest education level: Not on file   Social Needs     Financial resource strain: Not on file     Food insecurity - worry: Not on file     Food insecurity - inability: Not on file     Transportation needs - medical: Not on file     Transportation needs - non-medical: Not on file   Occupational History     Not on file   Tobacco Use     Smoking status: Never Smoker     Smokeless tobacco: Never Used   Substance and Sexual Activity     Alcohol use: No     Drug use: No     Sexual activity: No   Other Topics Concern     Not on file   Social History Narrative     Not on file         Review of Systems   Constitutional: Negative for activity change, appetite change and fatigue.   HENT: Negative for congestion.    Eyes: Positive for visual disturbance.   Respiratory: Negative for shortness of breath.    Cardiovascular: Negative for chest pain and leg swelling.   Gastrointestinal: Negative for abdominal pain, constipation and diarrhea.   Genitourinary: Negative for dysuria.   Musculoskeletal: Positive for gait problem.   Psychiatric/Behavioral: Positive for confusion.       .  Vitals:    18 1323   BP: 144/75   Pulse: 79   Temp: 98  F (36.7  C)   SpO2: 94%   Weight: 136 lb (61.7 kg)       Physical Exam   Constitutional: She appears well-developed and well-nourished.   HENT:   Head: Atraumatic.    Eyes: No scleral icterus.   Cardiovascular: Normal rate.   Murmur heard.  Pulmonary/Chest: No respiratory distress. She has no wheezes.   Abdominal: There is no tenderness.   Musculoskeletal: She exhibits no edema.   Lymphadenopathy:     She has no cervical adenopathy.   Neurological: She is alert.   Skin: Skin is warm and dry.   Psychiatric:   Flat affect          LABS:   Reviewed.     ASSESSMENT:      ICD-10-CM    1. Essential hypertension I10    2. Neuropathy (H) G62.9    3. Fall, sequela W19.XXXS    4. Type 2 diabetes mellitus with diabetic polyneuropathy, without long-term current use of insulin (H) E11.42        PLAN:    Changes: None; consider scheduled apap 650mg three times a day if falls/weakness or increased c/o pain.     Total 35 minutes of which 50% was spent counseling and coordination of care regarding hospital course and plan for TCU.    Electronically signed by: Devin Liang CNP

## 2021-06-22 NOTE — PROGRESS NOTES
[facility]    Facility:   [facility] [[id number]]   Code Status: POLST AVAILABLE    89-year-old female is seen for reevaluation. History of hypertension, vascular dementia, diabetes mellitus on oral agents, history of multiple falls, brought to emergency room by family following a fall, no acute bony injury, history of recurrent falls, stabilized in transferred to TCU for rehabilitation and management of medical problems.    Review of systems: patient recalls sliding from side of bed this weekend, denies injury. States she is 89 years old and has never broken a bone. No chest pain or palpitations. Participating in physical therapy. Remainder of 12 point review of systems obtained negative.    Exam: vital signs reviewed over past four days. Oriented to person, somewhat to place, not to time, pleasant and cooperative sitting on edge of bed in no apparent distress. Mucous membranes moist. No pharyngeal erythema. No credit bruits. S1 and S2 regular. Pulmonary exam without rales rhonchi or wheezes. Abdomen without masses tenderness organomegaly. Periphery with trace venous stasis changes, no edema. Joints without acute inflammatory change. No hand drift.    Impression and plan: history of recurrent falls, nursing report two incidents of patient found on floor over past 48 hours, no injury, patient states she slipped from side of bed, multiple falls over a number of years, no fractures or head injury, no current complaints of pain or indication of injury post fall. Hypertension with adequate control of blood pressure. Diabetes mellitus on oral agents. Advanced dementia without behavioral abnormalities. Continue rehabilitation measures and observation.      Electronically signed by: Mariangel Arrington MD

## 2021-06-23 NOTE — PROGRESS NOTES
[facility]    Facility:   [facility] [[id number]]   Code Status: POLST AVAILABLE    Admission evaluation to long-term [facility] on transfer from TCU. History is taken from previous medical notes, patient is able to give a limited history due to advanced dementia. Chronic hypertension, DJD bilateral knees, chronic major depression, progressive memory deficit on the basis of vascular dementia, history of multiple falls without fracture, etiology of falls multifactorial, fell at [facility], admitted to hospital, no acute  medical issues identified, transferred to TCU  for rehabilitation, several falls from edge of bed while in TCU without injury, due to advanced dementia transferred to long-term [facility].    Past medical history, current medical problem list, drug allergies, current medication list, social history, family history, code status reviewed in epic.    Review of systems: fatigue present at all times. Sleeps frequently through the day. Denies any acute or chronic pain. Limited mobility, denies focal neurologic deficits. Not aware of memory deficit. Denies hallucinations or delusions. No chest pain or palpitations. No current G.I. or  symptoms. Adapting to transfer to long-[facility], enjoys her room and roommate. Remainder of 12 point review of systems obtained negative.    Exam: vital signs reviewed since admission to [facility]. Oriented to person, somewhat to place, not to time, conversant and in no apparent distress sitting on edge of bed. Follows all conversation. Mood content. No facial asymmetry. Extraocular movements intact. Pharynx without erythema. Mild decreased vision. Thyroid without nodularity. No carotid bruits or HJR. S1 and S2 regular. Pulmonary exam without rales rhonchi or wheezes. Abdomen without masses tenderness organomegaly. DJD changes bilateral knees without acute inflammatory change. Pedal pulses symmetrical. No hand drift. Hand grasp  symmetrical. Strength and muscle tone diminished and symmetrical upper and lower extremities.    Impression and plan:   progressive vascular dementia without behavioral abnormalities.   Hypertension on lisinopril with satisfactory control of blood pressure, no orthostatic changes.   Peripheral neuropathy on gabapentin, nonpainful, decreased sensation contributing to falls.   Macular degeneration with vision deficit.   History of CVA without focal neurologic deficits.   Diabetes mellitus on oral agents, blood glucose control satisfactory.   History of multiple falls dating back years, no neurologic orthostatic or cardiac etiology identified, falls typically are from edge of bed.   Medical records are reviewed, patient concerns reviewed, medications reviewed.      Electronically signed by: Mariangel Arrington MD

## 2021-06-23 NOTE — PROGRESS NOTES
[facility]    Facility:   [facility] [[id number]]   Code Status: POLST AVAILABLE      CHIEF COMPLAINT/REASON FOR VISIT:  Chief Complaint   Patient presents with     Problem Visit     pain, blood pressures       HISTORY:      HPI: Devi is a 89 y.o. female with a history of hypertension, hyperlipidemia, diabetes on metformin, osteoarthritis,'s history of CVA and peripheral neuropathy/DJD.  She presented to the ED on 12/5 and discharged on Keflex and potassium for presumed UTI, was discharged and then returned on 12/7 for fall at home without major injury.  The patient herself is a good historian and denies hitting her head or injury.  She has no subsequent pain today.  She has had increasing falls over the last year and and was seen by PT/OT and told to start using a walker, per her family she has been noncompliant with a walker.  She does have some issues with incontinence and has reduced memory and cognition, she was using a PCA however PCA did not show up the week of her falls on admission and was subsequently fired.  Further workup revealed multifactorial causes for frequent falls including multiple weakness, inactivity, frailty, peripheral neuropathy and visual impairment.    LTC:   Continues to have variable bps between 118/76- 190/100. Attending therapies, using a walker to ambulate throughout the facility, has had multiple falls without injury in TCU and now on LTC; frequent falls continue to be an issue. She has been moved to a room near nursing station. On lisinopril and metoprolol.     DM 2: On metformin 500 with most recent A1c of 7.2; fine for her age. Consider d/c metformin and track BG daily as we are already doing.     Hypertension: on metoprolol and lisinopril with variable bps. Pulse stable. Blood pressures frequently up to 190/90s however most often occurring on night shift or early morning prior to medications. Daytime bps have been 150s/70s.      Osteoarthritis/periphral nueropathy: on gabapentin at HS. Will add tylenol 1000mg three times a day scheduled.     Past Medical History:   Diagnosis Date     ARMD (age related macular degeneration)      Back pain      Cerebral infarct (H) 2015     Diabetes mellitus (H)      Diverticulitis      Dizziness 10/28/2017     DM (diabetes mellitus) (H)      Fall 10/28/2017     Falls frequently 2018     Hyperlipidemia      Hypertension      Kidney stones      Macular degeneration      Major depressive disorder, single episode, unspecified      Neuropathy (H)      OA (osteoarthritis)      PN (peripheral neuropathy)      Polyneuropathy, unspecified      Sciatica      Stroke (H)      Unspecified dementia without behavioral disturbance              Family History   Problem Relation Age of Onset     Lung cancer Mother      No Medical Problems Father      Leukemia Brother      Sudden death Daughter          age 50's spleen ruptured     Diabetes Other      Social History     Socioeconomic History     Marital status:      Spouse name: Not on file     Number of children: Not on file     Years of education: Not on file     Highest education level: Not on file   Social Needs     Financial resource strain: Not on file     Food insecurity - worry: Not on file     Food insecurity - inability: Not on file     Transportation needs - medical: Not on file     Transportation needs - non-medical: Not on file   Occupational History     Not on file   Tobacco Use     Smoking status: Never Smoker     Smokeless tobacco: Never Used   Substance and Sexual Activity     Alcohol use: No     Drug use: No     Sexual activity: No   Other Topics Concern     Not on file   Social History Narrative     Not on file         Review of Systems   Constitutional: Negative for activity change, appetite change and fatigue.   HENT: Negative for congestion.    Eyes: Positive for visual disturbance.   Respiratory: Negative for shortness of  breath.    Cardiovascular: Negative for chest pain and leg swelling.   Gastrointestinal: Negative for abdominal pain, constipation and diarrhea.   Genitourinary: Negative for dysuria.   Musculoskeletal: Positive for arthralgias, back pain and gait problem.   Neurological: Positive for dizziness.   Psychiatric/Behavioral: Positive for confusion.       .  Vitals:    01/27/19 1243   BP: 176/90   Pulse: 70   Temp: 98  F (36.7  C)   Weight: 137 lb (62.1 kg)       Physical Exam   Constitutional: She appears well-developed and well-nourished.   HENT:   Head: Atraumatic.   Eyes: No scleral icterus.   Cardiovascular: Normal rate.   Murmur heard.  Pulmonary/Chest: No respiratory distress. She has no wheezes.   Abdominal: There is no tenderness.   Musculoskeletal: She exhibits no edema.   Lymphadenopathy:     She has no cervical adenopathy.   Neurological: She is alert.   Skin: Skin is warm and dry.   Psychiatric:   Flat affect          LABS:   Reviewed.     ASSESSMENT:      ICD-10-CM    1. Essential hypertension I10    2. Type 2 diabetes mellitus with diabetic polyneuropathy, without long-term current use of insulin (H) E11.42    3. Fall, sequela W19.XXXS        PLAN:    Start apap 1000mg po three times a day for arthralgias and joint/back pain.   Increase HS metoprolol from 37.5 to 50mg q hs in hopes to cover night time elevations. Monitor bp's M,W,F and pulse.   Will review blood sugars and consider d/c metformin with close follow up. She is 89 and a1c of 7.2 is well below recommended limit for age. No renal dx.     Electronically signed by: Devin Liang, CNP

## 2021-06-23 NOTE — PROGRESS NOTES
[facility]    Facility:   [facility] [[id number]]   Code Status: POLST AVAILABLE    89-year-old female is seen for reevaluation. Admitted to hospital with falls and generalized weakness, no acute medical issues identified, transferred to TCU for rehabilitation and management of medical problems which include dementia, hypertension, recurrent falls, chronic major depression.    Review of systems: continues with generalized fatigue. Denies chest pain or palpitations. No recent falls have occurred. Prefers to be in bed during the day, walks with assistance, at times independently. No cardiac or pulmonary symptoms. No focal neurologic deficits. Remainder of 12 point review of systems obtained negative.    Exam: vital signs reviewed over past five days. Oriented to person, somewhat to place and not to time, attentive. Answers all questions appropriately. No facial asymmetry. No pharyngeal erythema. No credit bruits or HJR. S1 and S2 regular with systolic murmur. Pulmonary exam without rales rhonchi or wheezes. Abdomen without masses tenderness organomegaly. Modest DJD changes of joints without acute inflammatory change. Periphery with venous stasis changes, no pitting edema. Pedal pulses symmetrical.    Impression and plan:   dementia, no behavioral abnormalities.   Hypertension with adequate control of blood pressure.   Generalized weakness and deconditioning, history of recurrent falls, no fractures with multiple falls over recent years, no falls over past 7+ days.   Chronic major depression, affect flat, mood remains stable.   Will require long-term care placement, anticipate transfer to long-term care over next seven days, completing physical therapy.      Electronically signed by: Mariangel Arrington MD

## 2021-06-24 NOTE — PROGRESS NOTES
"[facility]    Facility:   [facility] [[id number]]   Code Status: POLST AVAILABLE      CHIEF COMPLAINT/REASON FOR VISIT:  Chief Complaint   Patient presents with     Problem Visit     falls/ED visit       HISTORY:      HPI: Devi is a 89 y.o. female with a history of hypertension, hyperlipidemia, diabetes on metformin, osteoarthritis,'s history of CVA and peripheral neuropathy/DJD.  She presented to the ED on 12/5 and discharged on Keflex and potassium for presumed UTI, was discharged and then returned on 12/7 for fall at home without major injury.  The patient herself is a good historian and denies hitting her head or injury.  She has no subsequent pain today.  She has had increasing falls over the last year and and was seen by PT/OT and told to start using a walker, per her family she has been noncompliant with a walker.  She does have some issues with incontinence and has reduced memory and cognition, she was using a PCA however PCA did not show up the week of her falls on admission and was subsequently fired.  Further workup revealed multifactorial causes for frequent falls including multiple weakness, inactivity, frailty, peripheral neuropathy and visual impairment.    LTC: Patient has had increased number of falls since arriving to nursing home.  She has had 12 total falls however none with injury.  No hitting of her head.  We have adjusted some of her medications including her beta blocker and   Discontinue her Metformin.  Blood sugars not seem to be playing a role in this as well as blood pressures as her blood pressures are typically elevated and generally greater than 120/75.  She did have a fall just before my visit so I did examine her just post fall and she was sitting on the floor with one shoe half off, and she reported that her \"walker can keeps getting in the way \"she does not seem to understand that she should be using her walker to help ambulate up the room.  " Dementia seems to be a big factor in her falls as well as mechanical with tripping.  PT and OT have both examined her room.    Today: Patient was seen in the ED last evening for frequent falls and one fall with hitting her head, she apparently had some increased aggressiveness and agitation since that fall so nursing was concerned for acute head injury.  CT of the head was normal for acute process, she was evaluated and found to be stable, she was acting per baseline in the ED and her daughter was with her.  They did find mild AK I with a UTI so I will repeat a BMP on Friday and will continue the Keflex that was prescribed.    DM 2: On metformin 500 with most recent A1c of 7.2; fine for her age. Metformin d/c two weeks ago.     Hypertension: on metoprolol and lisinopril with variable bps. Pulse stable. Blood pressures frequently up to 160/90s however most often occurring on night shift or early morning prior to medications. Daytime bps have been 110//80.    Osteoarthritis/periphral nueropathy: on gabapentin at HS. Continue tylenol 1000mg three times a day scheduled.     Past Medical History:   Diagnosis Date     ARMD (age related macular degeneration)      Back pain      Cerebral infarct (H) 2015     Diabetes mellitus (H)      Diverticulitis      Dizziness 10/28/2017     DM (diabetes mellitus) (H)      Fall 10/28/2017     Falls frequently 2018     Hyperlipidemia      Hypertension      Kidney stones      Macular degeneration      Major depressive disorder, single episode, unspecified      Neuropathy (H)      OA (osteoarthritis)      PN (peripheral neuropathy)      Polyneuropathy, unspecified      Sciatica      Stroke (H)      Unspecified dementia without behavioral disturbance              Family History   Problem Relation Age of Onset     Lung cancer Mother      No Medical Problems Father      Leukemia Brother      Sudden death Daughter          age 50's spleen ruptured     Diabetes Other       Social History     Socioeconomic History     Marital status:      Spouse name: Not on file     Number of children: Not on file     Years of education: Not on file     Highest education level: Not on file   Occupational History     Not on file   Social Needs     Financial resource strain: Not on file     Food insecurity:     Worry: Not on file     Inability: Not on file     Transportation needs:     Medical: Not on file     Non-medical: Not on file   Tobacco Use     Smoking status: Never Smoker     Smokeless tobacco: Never Used   Substance and Sexual Activity     Alcohol use: No     Drug use: No     Sexual activity: No   Lifestyle     Physical activity:     Days per week: Not on file     Minutes per session: Not on file     Stress: Not on file   Relationships     Social connections:     Talks on phone: Not on file     Gets together: Not on file     Attends Muslim service: Not on file     Active member of club or organization: Not on file     Attends meetings of clubs or organizations: Not on file     Relationship status: Not on file     Intimate partner violence:     Fear of current or ex partner: Not on file     Emotionally abused: Not on file     Physically abused: Not on file     Forced sexual activity: Not on file   Other Topics Concern     Not on file   Social History Narrative     Not on file         Review of Systems   Constitutional: Negative for activity change, appetite change and fatigue.   HENT: Negative for congestion.    Respiratory: Negative for shortness of breath.    Cardiovascular: Negative for chest pain and leg swelling.   Gastrointestinal: Negative for abdominal pain, constipation and diarrhea.   Genitourinary: Negative for dysuria.   Musculoskeletal: Positive for arthralgias, back pain and gait problem.   Psychiatric/Behavioral: Positive for confusion.     .  Vitals:    02/22/19 1258   BP: 110/60   Pulse: 75   Resp: (!) 93   Temp: 98  F (36.7  C)       Physical Exam    Constitutional: She appears well-developed and well-nourished.   HENT:   Head: Atraumatic.   Eyes: No scleral icterus.   Cardiovascular: Normal rate.   Murmur heard.  Pulmonary/Chest: No respiratory distress. She has no wheezes.   Abdominal: There is no tenderness.   Musculoskeletal: She exhibits no edema.   Lymphadenopathy:     She has no cervical adenopathy.   Neurological: She is alert.   Skin: Skin is warm and dry.   Psychiatric:   Flat affect          LABS:   Reviewed.     ASSESSMENT:      ICD-10-CM    1. Cerebrovascular accident (CVA), unspecified mechanism (H) I63.9    2. Fall, sequela W19.XXXS    3. Type 2 diabetes mellitus with diabetic polyneuropathy, without long-term current use of insulin (H) E11.42    4. Essential hypertension I10        PLAN:    Obtain f/u BMP Friday.   Continue keflex for possible UTI.   Is participating in PT/OT so we will continue this per their recommendations.   Continue off metformin and decreased metoprolol.     Electronically signed by: Devin Liang, CNP

## 2021-06-24 NOTE — PROGRESS NOTES
"[facility]    Facility:   [facility] [[id number]]   Code Status: POLST AVAILABLE      CHIEF COMPLAINT/REASON FOR VISIT:  Chief Complaint   Patient presents with     Problem Visit       HISTORY:      HPI: Devi is a 89 y.o. female with a history of hypertension, hyperlipidemia, diabetes on metformin, osteoarthritis,'s history of CVA and peripheral neuropathy/DJD.  She presented to the ED on 12/5 and discharged on Keflex and potassium for presumed UTI, was discharged and then returned on 12/7 for fall at home without major injury.  The patient herself is a good historian and denies hitting her head or injury.  She has no subsequent pain today.  She has had increasing falls over the last year and and was seen by PT/OT and told to start using a walker, per her family she has been noncompliant with a walker.  She does have some issues with incontinence and has reduced memory and cognition, she was using a PCA however PCA did not show up the week of her falls on admission and was subsequently fired.  Further workup revealed multifactorial causes for frequent falls including multiple weakness, inactivity, frailty, peripheral neuropathy and visual impairment.    LTC: Patient has had increased number of falls since arriving to nursing home.  She has had 12 total falls however none with injury.  No hitting of her head.  We have adjusted some of her medications including her beta blocker and   Discontinue her Metformin.  Blood sugars not seem to be playing a role in this as well as blood pressures as her blood pressures are typically elevated and generally greater than 120/75.  She did have a fall just before my visit so I did examine her just post fall and she was sitting on the floor with one shoe half off, and she reported that her \"walker can keeps getting in the way \"she does not seem to understand that she should be using her walker to help ambulate up the room.  Dementia seems to " be a big factor in her falls as well as mechanical with tripping.  PT and OT have both examined her room.    DM 2: On metformin 500 with most recent A1c of 7.2; fine for her age. Metformin d/c two weeks ago.     Hypertension: on metoprolol and lisinopril with variable bps. Pulse stable. Blood pressures frequently up to 160/90s however most often occurring on night shift or early morning prior to medications. Daytime bps have been 110//80.    Osteoarthritis/periphral nueropathy: on gabapentin at HS. Continue tylenol 1000mg three times a day scheduled.     Past Medical History:   Diagnosis Date     ARMD (age related macular degeneration)      Back pain      Cerebral infarct (H) 2015     Diabetes mellitus (H)      Diverticulitis      Dizziness 10/28/2017     DM (diabetes mellitus) (H)      Fall 10/28/2017     Falls frequently 2018     Hyperlipidemia      Hypertension      Kidney stones      Macular degeneration      Major depressive disorder, single episode, unspecified      Neuropathy (H)      OA (osteoarthritis)      PN (peripheral neuropathy)      Polyneuropathy, unspecified      Sciatica      Stroke (H)      Unspecified dementia without behavioral disturbance              Family History   Problem Relation Age of Onset     Lung cancer Mother      No Medical Problems Father      Leukemia Brother      Sudden death Daughter          age 50's spleen ruptured     Diabetes Other      Social History     Socioeconomic History     Marital status:      Spouse name: Not on file     Number of children: Not on file     Years of education: Not on file     Highest education level: Not on file   Social Needs     Financial resource strain: Not on file     Food insecurity - worry: Not on file     Food insecurity - inability: Not on file     Transportation needs - medical: Not on file     Transportation needs - non-medical: Not on file   Occupational History     Not on file   Tobacco Use     Smoking  status: Never Smoker     Smokeless tobacco: Never Used   Substance and Sexual Activity     Alcohol use: No     Drug use: No     Sexual activity: No   Other Topics Concern     Not on file   Social History Narrative     Not on file         Review of Systems   Constitutional: Negative for activity change, appetite change and fatigue.   HENT: Negative for congestion.    Respiratory: Negative for shortness of breath.    Cardiovascular: Negative for chest pain and leg swelling.   Gastrointestinal: Negative for abdominal pain, constipation and diarrhea.   Genitourinary: Negative for dysuria.   Musculoskeletal: Positive for arthralgias, back pain and gait problem.   Psychiatric/Behavioral: Positive for confusion.     .  Vitals:    02/15/19 1456   BP: 115/63   Pulse: 85   SpO2: 95%       Physical Exam   Constitutional: She appears well-developed and well-nourished.   HENT:   Head: Atraumatic.   Eyes: No scleral icterus.   Cardiovascular: Normal rate.   Murmur heard.  Pulmonary/Chest: No respiratory distress. She has no wheezes.   Abdominal: There is no tenderness.   Musculoskeletal: She exhibits no edema.   Lymphadenopathy:     She has no cervical adenopathy.   Neurological: She is alert.   Skin: Skin is warm and dry.   Psychiatric:   Flat affect          LABS:   Reviewed.     ASSESSMENT:      ICD-10-CM    1. Fall, sequela W19.XXXS    2. Essential hypertension I10    3. Cerebrovascular accident (CVA), unspecified mechanism (H) I63.9        PLAN:    Obtain BMP to look for electrolyte abnormality, and also vitamin D.  Decrease fluoxetine to 10 mg p.o. daily however not convinced this is contributing to falls.  PT and OT to reexamine for mechanical issues related to fall.  Please assist patient with obtaining new shoes that are easier to get on and off and will not slip off her feet during ambulation.  OT to reexamine walker and appropriateness.    Electronically signed by: Devin Liang CNP